# Patient Record
Sex: FEMALE | Race: WHITE | NOT HISPANIC OR LATINO | Employment: FULL TIME | URBAN - METROPOLITAN AREA
[De-identification: names, ages, dates, MRNs, and addresses within clinical notes are randomized per-mention and may not be internally consistent; named-entity substitution may affect disease eponyms.]

---

## 2019-07-11 ENCOUNTER — OFFICE VISIT (OUTPATIENT)
Dept: INTERNAL MEDICINE | Facility: CLINIC | Age: 54
End: 2019-07-11

## 2019-07-11 ENCOUNTER — CLINICAL SUPPORT (OUTPATIENT)
Dept: INTERNAL MEDICINE | Facility: CLINIC | Age: 54
End: 2019-07-11

## 2019-07-11 ENCOUNTER — TELEPHONE (OUTPATIENT)
Dept: INTERNAL MEDICINE | Facility: CLINIC | Age: 54
End: 2019-07-11

## 2019-07-11 VITALS
TEMPERATURE: 98 F | HEIGHT: 67 IN | HEART RATE: 80 BPM | WEIGHT: 190.5 LBS | SYSTOLIC BLOOD PRESSURE: 122 MMHG | OXYGEN SATURATION: 99 % | BODY MASS INDEX: 29.9 KG/M2 | DIASTOLIC BLOOD PRESSURE: 86 MMHG

## 2019-07-11 DIAGNOSIS — Z00.00 ANNUAL PHYSICAL EXAM: Primary | ICD-10-CM

## 2019-07-11 DIAGNOSIS — Z00.00 ANNUAL PHYSICAL EXAM: ICD-10-CM

## 2019-07-11 DIAGNOSIS — R03.0 ELEVATED BLOOD PRESSURE READING: ICD-10-CM

## 2019-07-11 DIAGNOSIS — Z12.11 SCREENING FOR COLON CANCER: ICD-10-CM

## 2019-07-11 DIAGNOSIS — Z11.59 NEED FOR HEPATITIS C SCREENING TEST: ICD-10-CM

## 2019-07-11 DIAGNOSIS — Z01.419 WELL WOMAN EXAM: ICD-10-CM

## 2019-07-11 LAB
ALBUMIN SERPL BCP-MCNC: 4 G/DL (ref 3.5–5.2)
ALP SERPL-CCNC: 78 U/L (ref 55–135)
ALT SERPL W/O P-5'-P-CCNC: 20 U/L (ref 10–44)
ANION GAP SERPL CALC-SCNC: 10 MMOL/L (ref 8–16)
AST SERPL-CCNC: 26 U/L (ref 10–40)
BILIRUB SERPL-MCNC: 0.3 MG/DL (ref 0.1–1)
BUN SERPL-MCNC: 13 MG/DL (ref 6–20)
CALCIUM SERPL-MCNC: 9.4 MG/DL (ref 8.7–10.5)
CHLORIDE SERPL-SCNC: 108 MMOL/L (ref 95–110)
CHOLEST SERPL-MCNC: 220 MG/DL (ref 120–199)
CHOLEST/HDLC SERPL: 3.1 {RATIO} (ref 2–5)
CO2 SERPL-SCNC: 23 MMOL/L (ref 23–29)
CREAT SERPL-MCNC: 0.9 MG/DL (ref 0.5–1.4)
ERYTHROCYTE [DISTWIDTH] IN BLOOD BY AUTOMATED COUNT: 14.2 % (ref 11.5–14.5)
EST. GFR  (AFRICAN AMERICAN): >60 ML/MIN/1.73 M^2
EST. GFR  (NON AFRICAN AMERICAN): >60 ML/MIN/1.73 M^2
ESTIMATED AVG GLUCOSE: 111 MG/DL (ref 68–131)
GLUCOSE SERPL-MCNC: 111 MG/DL (ref 70–110)
HBA1C MFR BLD HPLC: 5.5 % (ref 4–5.6)
HCT VFR BLD AUTO: 39.6 % (ref 37–48.5)
HDLC SERPL-MCNC: 71 MG/DL (ref 40–75)
HDLC SERPL: 32.3 % (ref 20–50)
HGB BLD-MCNC: 12.4 G/DL (ref 12–16)
LDLC SERPL CALC-MCNC: 138.6 MG/DL (ref 63–159)
MCH RBC QN AUTO: 30.2 PG (ref 27–31)
MCHC RBC AUTO-ENTMCNC: 31.3 G/DL (ref 32–36)
MCV RBC AUTO: 97 FL (ref 82–98)
NONHDLC SERPL-MCNC: 149 MG/DL
PLATELET # BLD AUTO: 260 K/UL (ref 150–350)
PMV BLD AUTO: 11.3 FL (ref 9.2–12.9)
POTASSIUM SERPL-SCNC: 4 MMOL/L (ref 3.5–5.1)
PROT SERPL-MCNC: 7.6 G/DL (ref 6–8.4)
RBC # BLD AUTO: 4.1 M/UL (ref 4–5.4)
SODIUM SERPL-SCNC: 141 MMOL/L (ref 136–145)
TRIGL SERPL-MCNC: 52 MG/DL (ref 30–150)
TSH SERPL DL<=0.005 MIU/L-ACNC: 1.35 UIU/ML (ref 0.4–4)
WBC # BLD AUTO: 6.71 K/UL (ref 3.9–12.7)

## 2019-07-11 PROCEDURE — 80061 LIPID PANEL: CPT

## 2019-07-11 PROCEDURE — 99386 PREV VISIT NEW AGE 40-64: CPT | Mod: S$PBB,,, | Performed by: FAMILY MEDICINE

## 2019-07-11 PROCEDURE — 80053 COMPREHEN METABOLIC PANEL: CPT

## 2019-07-11 PROCEDURE — 99999 PR PBB SHADOW E&M-NEW PATIENT-LVL III: CPT | Mod: PBBFAC,,, | Performed by: FAMILY MEDICINE

## 2019-07-11 PROCEDURE — 85027 COMPLETE CBC AUTOMATED: CPT

## 2019-07-11 PROCEDURE — 99386 PR PREVENTIVE VISIT,NEW,40-64: ICD-10-PCS | Mod: S$PBB,,, | Performed by: FAMILY MEDICINE

## 2019-07-11 PROCEDURE — 84443 ASSAY THYROID STIM HORMONE: CPT

## 2019-07-11 PROCEDURE — 99203 OFFICE O/P NEW LOW 30 MIN: CPT | Mod: PBBFAC | Performed by: FAMILY MEDICINE

## 2019-07-11 PROCEDURE — 83036 HEMOGLOBIN GLYCOSYLATED A1C: CPT

## 2019-07-11 PROCEDURE — 99999 PR PBB SHADOW E&M-NEW PATIENT-LVL III: ICD-10-PCS | Mod: PBBFAC,,, | Performed by: FAMILY MEDICINE

## 2019-07-11 NOTE — PROGRESS NOTES
Subjective:      Patient ID: Celi Bermeo is a 54 y.o. female.    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    No new complaints today.    Completed CBC, CMP, TSH, Lipid panel, hemoglobin A1c today; results pending.    Blood pressure elevated on initial check per medical assistant today.  Patient states she is nervous for her exam today.    Health Care Maintenance:  Last tetanus booster:  States she is up to date (records with Employee Health)  Shingles vaccination:  Deferred today  Last pap smear:  3 years ago; needs OBGYN referral  Last mammogram:  May 2019 through [facility]  Colon cancer screening:  Has not had in the past, prefers FIT kit  Hepatitis C screening:  Has not had in the past.    History reviewed. No pertinent past medical history.    Past Surgical History:   Procedure Laterality Date    APPENDECTOMY  1979     SECTION         Family History   Problem Relation Age of Onset    Ulcers Mother         Duodenal ulcer    Diabetes type II Father        Social History     Socioeconomic History    Marital status: Unknown     Spouse name: Not on file    Number of children: Not on file    Years of education: Not on file    Highest education level: Not on file   Occupational History    Occupation: Pediatric endocrinologist   Social Needs    Financial resource strain: Not on file    Food insecurity:     Worry: Not on file     Inability: Not on file    Transportation needs:     Medical: Not on file     Non-medical: Not on file   Tobacco Use    Smoking status: Never Smoker    Smokeless tobacco: Never Used   Substance and Sexual Activity    Alcohol use: Yes     Frequency: 2-4 times a month     Drinks per session: 1 or 2    Drug use: Never    Sexual activity: Yes     Partners: Male   Lifestyle    Physical activity:     Days per week: Not on file      "Minutes per session: Not on file    Stress: Not on file   Relationships    Social connections:     Talks on phone: Not on file     Gets together: Not on file     Attends [employer] service: Not on file     Active member of club or organization: Not on file     Attends meetings of clubs or organizations: Not on file     Relationship status: Not on file   Other Topics Concern    Not on file   Social History Narrative    Not on file       Review of Systems   Constitutional: Negative for chills, fatigue and fever.   HENT: Negative for congestion, hearing loss, nosebleeds, rhinorrhea, sore throat and trouble swallowing.    Eyes: Negative for pain and visual disturbance.   Respiratory: Negative for cough, shortness of breath and wheezing.    Cardiovascular: Negative for chest pain and palpitations.   Gastrointestinal: Negative for abdominal distention, abdominal pain, constipation, diarrhea, nausea and vomiting.   Genitourinary: Negative for decreased urine volume, difficulty urinating, dysuria, hematuria and urgency.   Musculoskeletal: Negative for arthralgias, back pain and myalgias.   Skin: Negative for color change and rash.   Neurological: Negative for dizziness, tremors, weakness, light-headedness, numbness and headaches.   Psychiatric/Behavioral: Negative for agitation, behavioral problems and confusion. The patient is not nervous/anxious.      Objective:     Vitals:    07/11/19 0936 07/11/19 0959   BP: (!) 140/90 122/86   BP Location: Left arm    Patient Position: Sitting    BP Method: Medium (Manual)    Pulse: 80    Temp: 98.4 °F (36.9 °C)    TempSrc: Oral    SpO2: 99%    Weight: 86.4 kg (190 lb 7.6 oz)    Height: 5' 7" (1.702 m)        Physical Exam   Constitutional: She is oriented to person, place, and time. She appears well-developed and well-nourished.   HENT:   Head: Normocephalic and atraumatic.   Right Ear: External ear normal.   Left Ear: External ear normal.   Nose: Nose normal.   Mouth/Throat: " Oropharynx is clear and moist.   Eyes: Pupils are equal, round, and reactive to light. Conjunctivae and EOM are normal.   Neck: Normal range of motion. Neck supple. No tracheal deviation present.   Cardiovascular: Normal rate, regular rhythm and normal heart sounds. Exam reveals no gallop and no friction rub.   No murmur heard.  Pulmonary/Chest: Effort normal and breath sounds normal. No respiratory distress. She has no wheezes. She has no rales.   Abdominal: Soft. Bowel sounds are normal. She exhibits no distension. There is no tenderness. There is no rebound and no guarding.   Musculoskeletal: Normal range of motion. She exhibits no edema or deformity.   Neurological: She is alert and oriented to person, place, and time. Coordination normal.   Skin: Skin is warm and dry.   Psychiatric: She has a normal mood and affect. Her behavior is normal.   Nursing note and vitals reviewed.     Assessment:      1. Annual physical exam    2. Need for hepatitis C screening test    3. Screening for colon cancer    4. Well woman exam      Plan:   Celi was seen today for employment physical.    Diagnoses and all orders for this visit:    Annual physical exam        -     Letter to be sent once labs resulted.  To complete release of records form to obtain copy of mammogram report from [facility].    Elevated blood pressure reading        -     Improved on recheck.  Likely related to anxiety.  Continue to monitor.    Need for hepatitis C screening test  -     Hepatitis C antibody; Future    Screening for colon cancer  -     Fecal Immunochemical Test (iFOBT); Future  -     FitKit was given to patient on 7/11/2019 10:00 AM     Well woman exam  -     Ambulatory Referral to Obstetrics / Gynecology

## 2019-07-25 ENCOUNTER — PATIENT OUTREACH (OUTPATIENT)
Dept: ADMINISTRATIVE | Facility: HOSPITAL | Age: 54
End: 2019-07-25

## 2019-07-25 DIAGNOSIS — Z11.59 NEED FOR HEPATITIS C SCREENING TEST: Primary | ICD-10-CM

## 2019-07-25 NOTE — PROGRESS NOTES
Outreach to pt completed. Pt was given FIT at her last office visit. Pt says she will complete and turn it in once her insurance coverage is effective. Pt also aware she is due for pap, will schedule once she has insurance.

## 2019-08-07 ENCOUNTER — PATIENT OUTREACH (OUTPATIENT)
Dept: ADMINISTRATIVE | Facility: HOSPITAL | Age: 54
End: 2019-08-07

## 2020-04-21 DIAGNOSIS — Z01.84 ANTIBODY RESPONSE EXAMINATION: ICD-10-CM

## 2020-05-21 DIAGNOSIS — Z01.84 ANTIBODY RESPONSE EXAMINATION: ICD-10-CM

## 2020-06-20 DIAGNOSIS — Z01.84 ANTIBODY RESPONSE EXAMINATION: ICD-10-CM

## 2020-06-30 ENCOUNTER — OFFICE VISIT (OUTPATIENT)
Dept: INTERNAL MEDICINE | Facility: CLINIC | Age: 55
End: 2020-06-30
Payer: COMMERCIAL

## 2020-06-30 ENCOUNTER — CLINICAL SUPPORT (OUTPATIENT)
Dept: INTERNAL MEDICINE | Facility: CLINIC | Age: 55
End: 2020-06-30
Payer: COMMERCIAL

## 2020-06-30 VITALS
DIASTOLIC BLOOD PRESSURE: 78 MMHG | WEIGHT: 201.13 LBS | HEART RATE: 91 BPM | TEMPERATURE: 98 F | BODY MASS INDEX: 31.5 KG/M2 | SYSTOLIC BLOOD PRESSURE: 110 MMHG

## 2020-06-30 DIAGNOSIS — Z12.11 COLON CANCER SCREENING: Primary | ICD-10-CM

## 2020-06-30 DIAGNOSIS — Z00.00 ROUTINE GENERAL MEDICAL EXAMINATION AT A HEALTH CARE FACILITY: Primary | ICD-10-CM

## 2020-06-30 DIAGNOSIS — Z12.4 CERVICAL CANCER SCREENING: ICD-10-CM

## 2020-06-30 LAB
ALBUMIN SERPL BCP-MCNC: 3.9 G/DL (ref 3.5–5.2)
ALP SERPL-CCNC: 84 U/L (ref 55–135)
ALT SERPL W/O P-5'-P-CCNC: 21 U/L (ref 10–44)
ANION GAP SERPL CALC-SCNC: 8 MMOL/L (ref 8–16)
AST SERPL-CCNC: 25 U/L (ref 10–40)
BILIRUB SERPL-MCNC: 0.5 MG/DL (ref 0.1–1)
BUN SERPL-MCNC: 14 MG/DL (ref 6–20)
CALCIUM SERPL-MCNC: 9.5 MG/DL (ref 8.7–10.5)
CHLORIDE SERPL-SCNC: 107 MMOL/L (ref 95–110)
CHOLEST SERPL-MCNC: 202 MG/DL (ref 120–199)
CHOLEST/HDLC SERPL: 2.8 {RATIO} (ref 2–5)
CO2 SERPL-SCNC: 26 MMOL/L (ref 23–29)
CREAT SERPL-MCNC: 0.8 MG/DL (ref 0.5–1.4)
ERYTHROCYTE [DISTWIDTH] IN BLOOD BY AUTOMATED COUNT: 13.1 % (ref 11.5–14.5)
EST. GFR  (AFRICAN AMERICAN): >60 ML/MIN/1.73 M^2
EST. GFR  (NON AFRICAN AMERICAN): >60 ML/MIN/1.73 M^2
ESTIMATED AVG GLUCOSE: 111 MG/DL (ref 68–131)
GLUCOSE SERPL-MCNC: 107 MG/DL (ref 70–110)
HBA1C MFR BLD HPLC: 5.5 % (ref 4–5.6)
HCT VFR BLD AUTO: 41.1 % (ref 37–48.5)
HCV AB SERPL QL IA: NEGATIVE
HDLC SERPL-MCNC: 71 MG/DL (ref 40–75)
HDLC SERPL: 35.1 % (ref 20–50)
HGB BLD-MCNC: 13.1 G/DL (ref 12–16)
LDLC SERPL CALC-MCNC: 114.4 MG/DL (ref 63–159)
MCH RBC QN AUTO: 30.1 PG (ref 27–31)
MCHC RBC AUTO-ENTMCNC: 31.9 G/DL (ref 32–36)
MCV RBC AUTO: 95 FL (ref 82–98)
NONHDLC SERPL-MCNC: 131 MG/DL
PLATELET # BLD AUTO: 240 K/UL (ref 150–350)
PMV BLD AUTO: 11.6 FL (ref 9.2–12.9)
POTASSIUM SERPL-SCNC: 3.6 MMOL/L (ref 3.5–5.1)
PROT SERPL-MCNC: 7.6 G/DL (ref 6–8.4)
RBC # BLD AUTO: 4.35 M/UL (ref 4–5.4)
SODIUM SERPL-SCNC: 141 MMOL/L (ref 136–145)
TRIGL SERPL-MCNC: 83 MG/DL (ref 30–150)
TSH SERPL DL<=0.005 MIU/L-ACNC: 1.04 UIU/ML (ref 0.4–4)
WBC # BLD AUTO: 7.44 K/UL (ref 3.9–12.7)

## 2020-06-30 PROCEDURE — 99396 PREV VISIT EST AGE 40-64: CPT | Mod: S$GLB,,, | Performed by: INTERNAL MEDICINE

## 2020-06-30 PROCEDURE — 80061 LIPID PANEL: CPT

## 2020-06-30 PROCEDURE — 80053 COMPREHEN METABOLIC PANEL: CPT

## 2020-06-30 PROCEDURE — 84443 ASSAY THYROID STIM HORMONE: CPT

## 2020-06-30 PROCEDURE — 99396 PR PREVENTIVE VISIT,EST,40-64: ICD-10-PCS | Mod: S$GLB,,, | Performed by: INTERNAL MEDICINE

## 2020-06-30 PROCEDURE — 86803 HEPATITIS C AB TEST: CPT

## 2020-06-30 PROCEDURE — 36415 COLL VENOUS BLD VENIPUNCTURE: CPT

## 2020-06-30 PROCEDURE — 83036 HEMOGLOBIN GLYCOSYLATED A1C: CPT

## 2020-06-30 PROCEDURE — 85027 COMPLETE CBC AUTOMATED: CPT

## 2020-06-30 PROCEDURE — 99999 PR PBB SHADOW E&M-EST. PATIENT-LVL III: ICD-10-PCS | Mod: PBBFAC,,, | Performed by: INTERNAL MEDICINE

## 2020-06-30 PROCEDURE — 99999 PR PBB SHADOW E&M-EST. PATIENT-LVL III: CPT | Mod: PBBFAC,,, | Performed by: INTERNAL MEDICINE

## 2020-06-30 NOTE — LETTER
June 30, 2020    Celi Bermeo  [address] [place] [address]       [facility]  [address] [place] [address]  Phone: [phone]  Fax: [phone] Dear Dr. Bermeo:    Thank you for allowing me to serve you and perform your Executive Health exam on 6/30/2020.  This letter will serve a brief summary of the history, physical findings, and labs/studies performed and discussions at that time. I have included the lab and study results for you to have available at future healthcare appointments.    Reason for Visit: Executive Health Preventive Physical Examination     Physical Exam: No concerning findings.    Labs:  Comprehensive metabolic panel    Collection Time: 06/30/20 12:12 PM   Result Value Ref Range    Sodium 141 136 - 145 mmol/L    Potassium 3.6 3.5 - 5.1 mmol/L    Chloride 107 95 - 110 mmol/L    CO2 26 23 - 29 mmol/L    Glucose 107 70 - 110 mg/dL    BUN, Bld 14 6 - 20 mg/dL    Creatinine 0.8 0.5 - 1.4 mg/dL    Calcium 9.5 8.7 - 10.5 mg/dL    Total Protein 7.6 6.0 - 8.4 g/dL    Albumin 3.9 3.5 - 5.2 g/dL    Total Bilirubin 0.5 0.1 - 1.0 mg/dL    Alkaline Phosphatase 84 55 - 135 U/L    AST 25 10 - 40 U/L    ALT 21 10 - 44 U/L    Anion Gap 8 8 - 16 mmol/L    eGFR if African American >60.0 >60 mL/min/1.73 m^2    eGFR if non African American >60.0 >60 mL/min/1.73 m^2   CBC Without Differential    Collection Time: 06/30/20 12:12 PM   Result Value Ref Range    WBC 7.44 3.90 - 12.70 K/uL    RBC 4.35 4.00 - 5.40 M/uL    Hemoglobin 13.1 12.0 - 16.0 g/dL    Hematocrit 41.1 37.0 - 48.5 %    Mean Corpuscular Volume 95 82 - 98 fL    Mean Corpuscular Hemoglobin 30.1 27.0 - 31.0 pg    Mean Corpuscular Hemoglobin Conc 31.9 (L) 32.0 - 36.0 g/dL    RDW 13.1 11.5 - 14.5 %    Platelets 240 150 - 350 K/uL    MPV 11.6 9.2 - 12.9 fL   Lipid panel    Collection Time: 06/30/20 12:12 PM   Result Value Ref Range    Cholesterol 202 (H) 120 - 199 mg/dL     Triglycerides 83 30 - 150 mg/dL    HDL 71 40 - 75 mg/dL    LDL Cholesterol 114.4 63.0 - 159.0 mg/dL    Hdl/Cholesterol Ratio 35.1 20.0 - 50.0 %    Total Cholesterol/HDL Ratio 2.8 2.0 - 5.0    Non-HDL Cholesterol 131 mg/dL   TSH    Collection Time: 06/30/20 12:12 PM   Result Value Ref Range    TSH 1.042 0.400 - 4.000 uIU/mL   Hemoglobin A1c    Collection Time: 06/30/20 12:12 PM   Result Value Ref Range    Hemoglobin A1C 5.5 4.0 - 5.6 %    Estimated Avg Glucose 111 68 - 131 mg/dL   Hepatitis C Antibody    Collection Time: 06/30/20 12:12 PM   Result Value Ref Range    Hepatitis C Ab Negative Negative         Assessment/Recommendations:    Health Maintenance and Prevention:  - Annual flu vaccine.  - Tdap up to date (2019).  - You requested holding off on Shingrix. Let me know if you decide to get it and we can certainly have that done in our clinic.    - Hepatitis C screen was negative.    - PAP was normal 4 years ago. GYN referral placed for repeat.  - Mammogram normal 2019. As discussed, 2 year follow up in 2021 is very reasonable.  - FIT testing ordered. If normal, plan for repeat yearly. Let me know if there is any delay in receiving your kit.    - Blood pressure, glucose, and lipids in normal range.      It was a pleasure meeting you for your annual health exam, Dr. Bermeo. I look forward to seeing you again next year.     If you have any questions or concerns, please don't hesitate to call.    Sincerely,    Ander Moreno MD

## 2020-07-01 NOTE — PROGRESS NOTES
Subjective:       Patient ID: Celi Bermeo is a 55 y.o. female.    Chief Complaint: Annual Exam    HPI:  Here for annual health exam. No new complaints. 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 No previous major medical diagnoses.    HM:  Tdap received last year with .  Would like to hold off on Shingrix at this time.  Last mammo normal last year. Prefers q2y testing.  PAP normal 4 years ago. Not established with Gyn in [place] yet.  No colon cancer screening yet. Prefers FIT yearly.    Review of Systems   Constitutional: Negative for activity change, fatigue and fever.   HENT: Negative for hearing loss and sinus pain.    Eyes: Negative for visual disturbance.   Respiratory: Negative for cough and shortness of breath.    Cardiovascular: Negative for chest pain and palpitations.   Gastrointestinal: Negative for abdominal pain, constipation, diarrhea, nausea and vomiting.   Genitourinary: Negative for dysuria and frequency.   Musculoskeletal: Negative for arthralgias and joint swelling.   Skin: Negative for rash and wound.   Neurological: Negative for dizziness, weakness and headaches.   Psychiatric/Behavioral: Negative for dysphoric mood. The patient is not nervous/anxious.        History reviewed. No pertinent past medical history.     No current outpatient medications on file.     Past Surgical History:   Procedure Laterality Date    APPENDECTOMY  1979     SECTION       Family History   Problem Relation Age of Onset    Ulcers Mother         Duodenal ulcer    Diabetes type II Father      Social History     Tobacco Use    Smoking status: Never Smoker    Smokeless tobacco: Never Used   Substance Use Topics    Alcohol use: Yes     Frequency: 2-4 times a month     Drinks per session: 1 or 2    Drug use: Never     Objective:       Vitals:    20 1310   BP: 110/78   Pulse: 91   Temp: 98.3 °F (36.8 °C)     Physical Exam  Constitutional:       General: She is not  in acute distress.     Appearance: Normal appearance.   HENT:      Head: Normocephalic and atraumatic.   Eyes:      Extraocular Movements: Extraocular movements intact.      Conjunctiva/sclera: Conjunctivae normal.   Cardiovascular:      Rate and Rhythm: Normal rate and regular rhythm.      Heart sounds: Normal heart sounds. No murmur.   Pulmonary:      Effort: Pulmonary effort is normal. No respiratory distress.      Breath sounds: Normal breath sounds.   Abdominal:      General: Abdomen is flat. There is no distension.   Musculoskeletal:         General: No swelling or deformity.   Skin:     General: Skin is warm and dry.      Findings: No lesion or rash.   Neurological:      General: No focal deficit present.      Mental Status: She is alert and oriented to person, place, and time.      Coordination: Coordination normal.      Gait: Gait normal.      Deep Tendon Reflexes: Reflexes normal.   Psychiatric:         Mood and Affect: Mood normal.         Behavior: Behavior normal.         Thought Content: Thought content normal.         Judgment: Judgment normal.       Recent Results (from the past 2016 hour(s))   Comprehensive metabolic panel    Collection Time: 06/30/20 12:12 PM   Result Value Ref Range    Sodium 141 136 - 145 mmol/L    Potassium 3.6 3.5 - 5.1 mmol/L    Chloride 107 95 - 110 mmol/L    CO2 26 23 - 29 mmol/L    Glucose 107 70 - 110 mg/dL    BUN, Bld 14 6 - 20 mg/dL    Creatinine 0.8 0.5 - 1.4 mg/dL    Calcium 9.5 8.7 - 10.5 mg/dL    Total Protein 7.6 6.0 - 8.4 g/dL    Albumin 3.9 3.5 - 5.2 g/dL    Total Bilirubin 0.5 0.1 - 1.0 mg/dL    Alkaline Phosphatase 84 55 - 135 U/L    AST 25 10 - 40 U/L    ALT 21 10 - 44 U/L    Anion Gap 8 8 - 16 mmol/L    eGFR if African American >60.0 >60 mL/min/1.73 m^2    eGFR if non African American >60.0 >60 mL/min/1.73 m^2   CBC Without Differential    Collection Time: 06/30/20 12:12 PM   Result Value Ref Range    WBC 7.44 3.90 - 12.70 K/uL    RBC 4.35 4.00 - 5.40 M/uL     Hemoglobin 13.1 12.0 - 16.0 g/dL    Hematocrit 41.1 37.0 - 48.5 %    Mean Corpuscular Volume 95 82 - 98 fL    Mean Corpuscular Hemoglobin 30.1 27.0 - 31.0 pg    Mean Corpuscular Hemoglobin Conc 31.9 (L) 32.0 - 36.0 g/dL    RDW 13.1 11.5 - 14.5 %    Platelets 240 150 - 350 K/uL    MPV 11.6 9.2 - 12.9 fL   Lipid panel    Collection Time: 06/30/20 12:12 PM   Result Value Ref Range    Cholesterol 202 (H) 120 - 199 mg/dL    Triglycerides 83 30 - 150 mg/dL    HDL 71 40 - 75 mg/dL    LDL Cholesterol 114.4 63.0 - 159.0 mg/dL    Hdl/Cholesterol Ratio 35.1 20.0 - 50.0 %    Total Cholesterol/HDL Ratio 2.8 2.0 - 5.0    Non-HDL Cholesterol 131 mg/dL   TSH    Collection Time: 06/30/20 12:12 PM   Result Value Ref Range    TSH 1.042 0.400 - 4.000 uIU/mL   Hemoglobin A1c    Collection Time: 06/30/20 12:12 PM   Result Value Ref Range    Hemoglobin A1C 5.5 4.0 - 5.6 %    Estimated Avg Glucose 111 68 - 131 mg/dL   Hepatitis C Antibody    Collection Time: 06/30/20 12:12 PM   Result Value Ref Range    Hepatitis C Ab Negative Negative          Assessment/Plan:     1) Annual Exam / HM review:  - Annual flu vaccine  - Tdap up to date (2019)  - Declines Shingrix at this time.  - Hep C screen negative.  - PAP normal 4 years ago. GYN referral placed for repeat.  - Mammo normal 2019. Prefers q2y screening.  - FIT testing ordered. If normal, plan for repeat yearly.  - BP, BG, lipids all wnl.    RTC 1 year for annual health exam or sooner if needed. Will f/u results of PAP and FIT as available.

## 2020-07-20 DIAGNOSIS — Z01.84 ANTIBODY RESPONSE EXAMINATION: ICD-10-CM

## 2020-08-19 ENCOUNTER — TELEPHONE (OUTPATIENT)
Dept: INTERNAL MEDICINE | Facility: CLINIC | Age: 55
End: 2020-08-19

## 2020-08-19 DIAGNOSIS — Z01.84 ANTIBODY RESPONSE EXAMINATION: ICD-10-CM

## 2020-08-19 DIAGNOSIS — B00.2 ORAL HERPES SIMPLEX INFECTION: Primary | ICD-10-CM

## 2020-08-19 RX ORDER — VALACYCLOVIR HYDROCHLORIDE 1 G/1
2000 TABLET, FILM COATED ORAL EVERY 12 HOURS
Qty: 4 TABLET | Refills: 1 | Status: SHIPPED | OUTPATIENT
Start: 2020-08-19 | End: 2020-08-20 | Stop reason: SDUPTHER

## 2020-08-19 NOTE — TELEPHONE ENCOUNTER
Spoke with Dr. Bermeo. Beginnings of cold sore forming after heavy sun exposure. Has responded well to Valacyclovir in past. Will send prescription.

## 2020-08-20 DIAGNOSIS — B00.2 ORAL HERPES SIMPLEX INFECTION: ICD-10-CM

## 2020-08-20 RX ORDER — VALACYCLOVIR HYDROCHLORIDE 1 G/1
1000 TABLET, FILM COATED ORAL EVERY 8 HOURS PRN
Qty: 21 TABLET | Refills: 2 | Status: SHIPPED | OUTPATIENT
Start: 2020-08-20 | End: 2021-05-17

## 2020-09-18 DIAGNOSIS — Z01.84 ANTIBODY RESPONSE EXAMINATION: ICD-10-CM

## 2020-10-12 ENCOUNTER — TELEPHONE (OUTPATIENT)
Dept: INTERNAL MEDICINE | Facility: CLINIC | Age: 55
End: 2020-10-12

## 2020-10-12 DIAGNOSIS — E66.9 OBESITY, UNSPECIFIED CLASSIFICATION, UNSPECIFIED OBESITY TYPE, UNSPECIFIED WHETHER SERIOUS COMORBIDITY PRESENT: Primary | ICD-10-CM

## 2020-10-12 DIAGNOSIS — E88.819 INSULIN RESISTANCE: ICD-10-CM

## 2020-10-12 RX ORDER — ORAL SEMAGLUTIDE 3 MG/1
3 TABLET ORAL DAILY
Qty: 30 TABLET | Refills: 0 | Status: SHIPPED | OUTPATIENT
Start: 2020-10-12 | End: 2020-10-14 | Stop reason: SDUPTHER

## 2020-10-12 NOTE — TELEPHONE ENCOUNTER
Contacted by Dr. Pantoja today. Interested in starting oral Rybelsus for weight loss and insulin resistance. Very reasonable option. Sending 30 days of 3 mg tablets daily. Will plan to increase to 7 mg daily if tolerated well.

## 2020-10-14 RX ORDER — ORAL SEMAGLUTIDE 3 MG/1
3 TABLET ORAL DAILY
Qty: 30 TABLET | Refills: 0 | Status: SHIPPED | OUTPATIENT
Start: 2020-10-14 | End: 2020-11-06

## 2020-10-18 DIAGNOSIS — Z01.84 ANTIBODY RESPONSE EXAMINATION: ICD-10-CM

## 2020-11-06 DIAGNOSIS — E88.819 INSULIN RESISTANCE: ICD-10-CM

## 2020-11-06 DIAGNOSIS — E66.9 OBESITY, UNSPECIFIED CLASSIFICATION, UNSPECIFIED OBESITY TYPE, UNSPECIFIED WHETHER SERIOUS COMORBIDITY PRESENT: ICD-10-CM

## 2020-11-06 RX ORDER — ORAL SEMAGLUTIDE 3 MG/1
3 TABLET ORAL DAILY
Qty: 30 TABLET | Refills: 5 | Status: SHIPPED | OUTPATIENT
Start: 2020-11-06 | End: 2020-11-07

## 2020-11-07 RX ORDER — ORAL SEMAGLUTIDE 7 MG/1
7 TABLET ORAL DAILY
Qty: 30 TABLET | Refills: 0 | Status: SHIPPED | OUTPATIENT
Start: 2020-11-07 | End: 2020-12-02

## 2020-11-10 ENCOUNTER — PATIENT OUTREACH (OUTPATIENT)
Dept: ADMINISTRATIVE | Facility: OTHER | Age: 55
End: 2020-11-10

## 2020-11-12 ENCOUNTER — OFFICE VISIT (OUTPATIENT)
Dept: CARDIOLOGY | Facility: CLINIC | Age: 55
End: 2020-11-12
Payer: COMMERCIAL

## 2020-11-12 VITALS
DIASTOLIC BLOOD PRESSURE: 74 MMHG | HEIGHT: 67 IN | HEART RATE: 85 BPM | BODY MASS INDEX: 32.53 KG/M2 | SYSTOLIC BLOOD PRESSURE: 152 MMHG | WEIGHT: 207.25 LBS

## 2020-11-12 DIAGNOSIS — E78.00 PURE HYPERCHOLESTEROLEMIA: ICD-10-CM

## 2020-11-12 DIAGNOSIS — R00.2 PALPITATIONS: Primary | ICD-10-CM

## 2020-11-12 DIAGNOSIS — R03.0 ELEVATED BLOOD PRESSURE READING: ICD-10-CM

## 2020-11-12 PROCEDURE — 93000 EKG 12-LEAD: ICD-10-PCS | Mod: S$GLB,,, | Performed by: INTERNAL MEDICINE

## 2020-11-12 PROCEDURE — 99999 PR PBB SHADOW E&M-EST. PATIENT-LVL III: ICD-10-PCS | Mod: PBBFAC,,, | Performed by: INTERNAL MEDICINE

## 2020-11-12 PROCEDURE — 99999 PR PBB SHADOW E&M-EST. PATIENT-LVL III: CPT | Mod: PBBFAC,,, | Performed by: INTERNAL MEDICINE

## 2020-11-12 PROCEDURE — 93000 ELECTROCARDIOGRAM COMPLETE: CPT | Mod: S$GLB,,, | Performed by: INTERNAL MEDICINE

## 2020-11-12 PROCEDURE — 99205 OFFICE O/P NEW HI 60 MIN: CPT | Mod: S$GLB,,, | Performed by: INTERNAL MEDICINE

## 2020-11-12 PROCEDURE — 99205 PR OFFICE/OUTPT VISIT, NEW, LEVL V, 60-74 MIN: ICD-10-PCS | Mod: S$GLB,,, | Performed by: INTERNAL MEDICINE

## 2020-11-12 RX ORDER — CALCIUM CARBONATE 500(1250)
1 TABLET ORAL DAILY
COMMUNITY
End: 2023-10-10

## 2020-11-12 NOTE — PROGRESS NOTES
Subjective:       Patient ID: Celi Bermeo is a 55 y.o. female.    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 She has had palpitations for approximately 10 years, which started when she was living in [place] and taking an amphetamine medication for weight loss. She stopped taking this medication, but continues to have episodes of palpitations 3-4 times per year. She describes a rapid heart rate which may last 1-2 hours, typically relieved with valsalva and/or carotid massage. She denies any history of TIA, stroke, chest pain/pressure/tightness/discomfort, MAHAN, orthopnea, PND, edema or syncope. She has never smoked, rarely consumes alcohol and there is no family history of CAD.    Review of Systems   All other systems reviewed and are negative.      Objective:       Vitals:    11/12/20 1129   BP: (!) 152/74   Pulse: 85       Physical Exam  Constitutional:       Appearance: She is well-developed. She is not diaphoretic.   HENT:      Head: Normocephalic and atraumatic.   Eyes:      Pupils: Pupils are equal, round, and reactive to light.   Neck:      Musculoskeletal: Normal range of motion.      Vascular: No carotid bruit or JVD.   Cardiovascular:      Rate and Rhythm: Normal rate and regular rhythm.      Pulses:           Radial pulses are 2+ on the right side and 2+ on the left side.        Dorsalis pedis pulses are 2+ on the right side and 2+ on the left side.        Posterior tibial pulses are 2+ on the right side and 2+ on the left side.      Heart sounds: Normal heart sounds. Heart sounds not distant. No murmur. No friction rub. No gallop. No S3 or S4 sounds.    Pulmonary:      Effort: Pulmonary effort is normal. No respiratory distress.      Breath sounds: Normal breath sounds. No wheezing.   Abdominal:      General: Bowel sounds are normal. There is no distension.       Palpations: Abdomen is soft.      Tenderness: There is no abdominal tenderness.   Skin:     General: Skin is warm.      Findings: No erythema.   Neurological:      Mental Status: She is alert and oriented to person, place, and time.   Psychiatric:         Behavior: Behavior normal.         Assessment:       1. Palpitations    2. Pure hypercholesterolemia    3. Elevated blood pressure reading        Plan:       Palpitations  EKG reviewed in clinic; NSR, within normal limits  Due to the infrequency of palpitations (3-4 times/year) a 30 day event monitor is unlikely to reveal a diagnosis  We discussed options such as an implantable loop recorder or an apple watch with the EKG miesha; Dr. Bermeo is interested in the latter option  Check TTE    Elevated blood pressure reading  152/74 in clinic, patient states her BP has never been this high in the past  Maintain BP log, return to clinic with BP log and TTE results in 2 weeks  If a diagnosis of HTN is established, will discuss polysomnography  Discussed low sodium diet and the importance of exercise    Hyperlipidemia  10 year ASCVD risk 1.7%  Diet and lifestyle modifications as above    The ASCVD Risk score (Lizette DC Jr., et al., 2013) failed to calculate for the following reasons:    Unable to determine if patient is Non-       Celi was seen today for palpitations.    Diagnoses and all orders for this visit:    Palpitations  -     EKG 12-lead; Future  -     Echo Color Flow Doppler? Yes; Future    Pure hypercholesterolemia    Elevated blood pressure reading      Sam Kaplan MD  PGY-VI

## 2020-11-12 NOTE — PROGRESS NOTES
I have personally taken the history and examined this patient and agree with the fellow's note as stated above.Discussed the acquisition of a readily available home monitoring devices including the Apple watch or some of the less expensive obliquely available monitors.  She appears to be experiencing some form of atrial tachycardia since it has been alleviated by vagal maneuvers.  Once documented and if episodes become more frequent, will refer to EP for consideration of radiofrequency ablation

## 2020-11-17 DIAGNOSIS — Z01.84 ANTIBODY RESPONSE EXAMINATION: ICD-10-CM

## 2020-12-02 DIAGNOSIS — E66.9 OBESITY, UNSPECIFIED CLASSIFICATION, UNSPECIFIED OBESITY TYPE, UNSPECIFIED WHETHER SERIOUS COMORBIDITY PRESENT: ICD-10-CM

## 2020-12-02 DIAGNOSIS — E88.819 INSULIN RESISTANCE: Primary | ICD-10-CM

## 2020-12-02 RX ORDER — ORAL SEMAGLUTIDE 14 MG/1
14 TABLET ORAL DAILY
Qty: 30 TABLET | Refills: 11 | Status: SHIPPED | OUTPATIENT
Start: 2020-12-02 | End: 2020-12-19 | Stop reason: SDUPTHER

## 2020-12-19 DIAGNOSIS — E88.819 INSULIN RESISTANCE: ICD-10-CM

## 2020-12-19 DIAGNOSIS — E66.9 OBESITY, UNSPECIFIED CLASSIFICATION, UNSPECIFIED OBESITY TYPE, UNSPECIFIED WHETHER SERIOUS COMORBIDITY PRESENT: ICD-10-CM

## 2020-12-19 RX ORDER — ORAL SEMAGLUTIDE 14 MG/1
14 TABLET ORAL DAILY
Qty: 30 TABLET | Refills: 11 | Status: SHIPPED | OUTPATIENT
Start: 2020-12-19 | End: 2021-03-01 | Stop reason: SDUPTHER

## 2020-12-26 ENCOUNTER — IMMUNIZATION (OUTPATIENT)
Dept: INTERNAL MEDICINE | Facility: CLINIC | Age: 55
End: 2020-12-26
Payer: COMMERCIAL

## 2020-12-26 DIAGNOSIS — Z23 NEED FOR VACCINATION: ICD-10-CM

## 2020-12-26 PROCEDURE — 0001A COVID-19, MRNA, LNP-S, PF, 30 MCG/0.3 ML DOSE VACCINE: CPT | Mod: CV19,,, | Performed by: INTERNAL MEDICINE

## 2020-12-26 PROCEDURE — 0001A COVID-19, MRNA, LNP-S, PF, 30 MCG/0.3 ML DOSE VACCINE: ICD-10-PCS | Mod: CV19,,, | Performed by: INTERNAL MEDICINE

## 2020-12-26 PROCEDURE — 91300 COVID-19, MRNA, LNP-S, PF, 30 MCG/0.3 ML DOSE VACCINE: CPT | Mod: ,,, | Performed by: INTERNAL MEDICINE

## 2020-12-26 PROCEDURE — 91300 COVID-19, MRNA, LNP-S, PF, 30 MCG/0.3 ML DOSE VACCINE: ICD-10-PCS | Mod: ,,, | Performed by: INTERNAL MEDICINE

## 2021-01-05 ENCOUNTER — PATIENT OUTREACH (OUTPATIENT)
Dept: ADMINISTRATIVE | Facility: OTHER | Age: 56
End: 2021-01-05

## 2021-01-05 DIAGNOSIS — Z12.31 BREAST CANCER SCREENING BY MAMMOGRAM: Primary | ICD-10-CM

## 2021-01-07 ENCOUNTER — OFFICE VISIT (OUTPATIENT)
Dept: BARIATRICS | Facility: CLINIC | Age: 56
End: 2021-01-07
Payer: COMMERCIAL

## 2021-01-07 VITALS
DIASTOLIC BLOOD PRESSURE: 74 MMHG | OXYGEN SATURATION: 98 % | HEIGHT: 68 IN | WEIGHT: 201.69 LBS | HEART RATE: 83 BPM | BODY MASS INDEX: 30.57 KG/M2 | SYSTOLIC BLOOD PRESSURE: 136 MMHG

## 2021-01-07 DIAGNOSIS — E88.819 INSULIN RESISTANCE: ICD-10-CM

## 2021-01-07 DIAGNOSIS — E66.9 OBESITY, CLASS I, BMI 30.0-34.9 (SEE ACTUAL BMI): Primary | ICD-10-CM

## 2021-01-07 PROCEDURE — 99999 PR PBB SHADOW E&M-EST. PATIENT-LVL IV: ICD-10-PCS | Mod: PBBFAC,,, | Performed by: INTERNAL MEDICINE

## 2021-01-07 PROCEDURE — 99999 PR PBB SHADOW E&M-EST. PATIENT-LVL IV: CPT | Mod: PBBFAC,,, | Performed by: INTERNAL MEDICINE

## 2021-01-07 PROCEDURE — 3008F PR BODY MASS INDEX (BMI) DOCUMENTED: ICD-10-PCS | Mod: CPTII,S$GLB,, | Performed by: INTERNAL MEDICINE

## 2021-01-07 PROCEDURE — 3008F BODY MASS INDEX DOCD: CPT | Mod: CPTII,S$GLB,, | Performed by: INTERNAL MEDICINE

## 2021-01-07 PROCEDURE — 1126F AMNT PAIN NOTED NONE PRSNT: CPT | Mod: S$GLB,,, | Performed by: INTERNAL MEDICINE

## 2021-01-07 PROCEDURE — 99204 OFFICE O/P NEW MOD 45 MIN: CPT | Mod: S$GLB,,, | Performed by: INTERNAL MEDICINE

## 2021-01-07 PROCEDURE — 99204 PR OFFICE/OUTPT VISIT, NEW, LEVL IV, 45-59 MIN: ICD-10-PCS | Mod: S$GLB,,, | Performed by: INTERNAL MEDICINE

## 2021-01-07 PROCEDURE — 1126F PR PAIN SEVERITY QUANTIFIED, NO PAIN PRESENT: ICD-10-PCS | Mod: S$GLB,,, | Performed by: INTERNAL MEDICINE

## 2021-01-07 RX ORDER — TOPIRAMATE 25 MG/1
25 TABLET ORAL 2 TIMES DAILY
Qty: 60 TABLET | Refills: 3 | Status: SHIPPED | OUTPATIENT
Start: 2021-01-07 | End: 2021-03-30

## 2021-01-16 ENCOUNTER — IMMUNIZATION (OUTPATIENT)
Dept: INTERNAL MEDICINE | Facility: CLINIC | Age: 56
End: 2021-01-16
Payer: COMMERCIAL

## 2021-01-16 DIAGNOSIS — Z23 NEED FOR VACCINATION: Primary | ICD-10-CM

## 2021-01-16 PROCEDURE — 91300 COVID-19, MRNA, LNP-S, PF, 30 MCG/0.3 ML DOSE VACCINE: CPT | Mod: PBBFAC | Performed by: INTERNAL MEDICINE

## 2021-01-16 PROCEDURE — 0002A COVID-19, MRNA, LNP-S, PF, 30 MCG/0.3 ML DOSE VACCINE: CPT | Mod: PBBFAC | Performed by: INTERNAL MEDICINE

## 2021-01-29 ENCOUNTER — PATIENT MESSAGE (OUTPATIENT)
Dept: ADMINISTRATIVE | Facility: HOSPITAL | Age: 56
End: 2021-01-29

## 2021-02-05 ENCOUNTER — PATIENT OUTREACH (OUTPATIENT)
Dept: ADMINISTRATIVE | Facility: OTHER | Age: 56
End: 2021-02-05

## 2021-02-08 ENCOUNTER — OFFICE VISIT (OUTPATIENT)
Dept: BARIATRICS | Facility: CLINIC | Age: 56
End: 2021-02-08
Payer: COMMERCIAL

## 2021-02-08 VITALS
WEIGHT: 195.31 LBS | SYSTOLIC BLOOD PRESSURE: 116 MMHG | DIASTOLIC BLOOD PRESSURE: 84 MMHG | HEIGHT: 68 IN | HEART RATE: 101 BPM | BODY MASS INDEX: 29.6 KG/M2

## 2021-02-08 DIAGNOSIS — E66.9 OBESITY, CLASS I, BMI 30.0-34.9 (SEE ACTUAL BMI): Primary | ICD-10-CM

## 2021-02-08 PROCEDURE — 1126F PR PAIN SEVERITY QUANTIFIED, NO PAIN PRESENT: ICD-10-PCS | Mod: S$GLB,,, | Performed by: INTERNAL MEDICINE

## 2021-02-08 PROCEDURE — 99214 PR OFFICE/OUTPT VISIT, EST, LEVL IV, 30-39 MIN: ICD-10-PCS | Mod: S$GLB,,, | Performed by: INTERNAL MEDICINE

## 2021-02-08 PROCEDURE — 99999 PR PBB SHADOW E&M-EST. PATIENT-LVL III: ICD-10-PCS | Mod: PBBFAC,,, | Performed by: INTERNAL MEDICINE

## 2021-02-08 PROCEDURE — 99999 PR PBB SHADOW E&M-EST. PATIENT-LVL III: CPT | Mod: PBBFAC,,, | Performed by: INTERNAL MEDICINE

## 2021-02-08 PROCEDURE — 1126F AMNT PAIN NOTED NONE PRSNT: CPT | Mod: S$GLB,,, | Performed by: INTERNAL MEDICINE

## 2021-02-08 PROCEDURE — 3008F PR BODY MASS INDEX (BMI) DOCUMENTED: ICD-10-PCS | Mod: CPTII,S$GLB,, | Performed by: INTERNAL MEDICINE

## 2021-02-08 PROCEDURE — 99214 OFFICE O/P EST MOD 30 MIN: CPT | Mod: S$GLB,,, | Performed by: INTERNAL MEDICINE

## 2021-02-08 PROCEDURE — 3008F BODY MASS INDEX DOCD: CPT | Mod: CPTII,S$GLB,, | Performed by: INTERNAL MEDICINE

## 2021-03-01 DIAGNOSIS — E88.819 INSULIN RESISTANCE: ICD-10-CM

## 2021-03-01 DIAGNOSIS — R73.01 IFG (IMPAIRED FASTING GLUCOSE): Primary | ICD-10-CM

## 2021-03-01 DIAGNOSIS — E66.9 OBESITY, UNSPECIFIED CLASSIFICATION, UNSPECIFIED OBESITY TYPE, UNSPECIFIED WHETHER SERIOUS COMORBIDITY PRESENT: ICD-10-CM

## 2021-03-01 RX ORDER — ORAL SEMAGLUTIDE 14 MG/1
14 TABLET ORAL DAILY
Qty: 30 TABLET | Refills: 5 | Status: SHIPPED | OUTPATIENT
Start: 2021-03-01 | End: 2021-10-08 | Stop reason: SDUPTHER

## 2021-05-06 ENCOUNTER — PATIENT OUTREACH (OUTPATIENT)
Dept: ADMINISTRATIVE | Facility: OTHER | Age: 56
End: 2021-05-06

## 2021-05-17 DIAGNOSIS — B00.2 ORAL HERPES SIMPLEX INFECTION: ICD-10-CM

## 2021-05-17 RX ORDER — VALACYCLOVIR HYDROCHLORIDE 1 G/1
1000 TABLET, FILM COATED ORAL EVERY 8 HOURS PRN
Qty: 21 TABLET | Refills: 2 | Status: SHIPPED | OUTPATIENT
Start: 2021-05-17 | End: 2021-09-27

## 2021-07-06 ENCOUNTER — PATIENT OUTREACH (OUTPATIENT)
Dept: ADMINISTRATIVE | Facility: OTHER | Age: 56
End: 2021-07-06

## 2021-07-14 ENCOUNTER — HOSPITAL ENCOUNTER (OUTPATIENT)
Dept: CARDIOLOGY | Facility: HOSPITAL | Age: 56
Discharge: HOME | End: 2021-07-14
Attending: INTERNAL MEDICINE
Payer: COMMERCIAL

## 2021-07-14 VITALS
DIASTOLIC BLOOD PRESSURE: 82 MMHG | SYSTOLIC BLOOD PRESSURE: 118 MMHG | HEIGHT: 67 IN | BODY MASS INDEX: 30.61 KG/M2 | WEIGHT: 195 LBS | HEART RATE: 80 BPM

## 2021-07-14 DIAGNOSIS — R00.2 PALPITATIONS: ICD-10-CM

## 2021-07-14 LAB
ASCENDING AORTA: 3.11 CM
AV INDEX (PROSTH): 0.87
AV MEAN GRADIENT: 3 MMHG
AV PEAK GRADIENT: 5 MMHG
AV VALVE AREA: 2.66 CM2
AV VELOCITY RATIO: 0.86
BSA FOR ECHO PROCEDURE: 2.04 M2
CV ECHO LV RWT: 0.34 CM
DOP CALC AO PEAK VEL: 1.13 M/S
DOP CALC AO VTI: 23.42 CM
DOP CALC LVOT AREA: 3.1 CM2
DOP CALC LVOT DIAMETER: 1.98 CM
DOP CALC LVOT PEAK VEL: 0.97 M/S
DOP CALC LVOT STROKE VOLUME: 62.38 CM3
DOP CALCLVOT PEAK VEL VTI: 20.27 CM
E WAVE DECELERATION TIME: 157.17 MSEC
E/A RATIO: 0.7
E/E' RATIO: 6.2 M/S
ECHO LV POSTERIOR WALL: 0.74 CM (ref 0.6–1.1)
EJECTION FRACTION: 60 %
FRACTIONAL SHORTENING: 38 % (ref 28–44)
INTERVENTRICULAR SEPTUM: 0.71 CM (ref 0.6–1.1)
IVRT: 88.49 MSEC
LA MAJOR: 4.62 CM
LA MINOR: 4.56 CM
LA WIDTH: 3.56 CM
LEFT ATRIUM SIZE: 2.64 CM
LEFT ATRIUM VOLUME INDEX MOD: 18.7 ML/M2
LEFT ATRIUM VOLUME INDEX: 18.3 ML/M2
LEFT ATRIUM VOLUME MOD: 37.37 CM3
LEFT ATRIUM VOLUME: 36.67 CM3
LEFT INTERNAL DIMENSION IN SYSTOLE: 2.72 CM (ref 2.1–4)
LEFT VENTRICLE DIASTOLIC VOLUME INDEX: 43.77 ML/M2
LEFT VENTRICLE DIASTOLIC VOLUME: 87.54 ML
LEFT VENTRICLE MASS INDEX: 48 G/M2
LEFT VENTRICLE SYSTOLIC VOLUME INDEX: 13.7 ML/M2
LEFT VENTRICLE SYSTOLIC VOLUME: 27.42 ML
LEFT VENTRICULAR INTERNAL DIMENSION IN DIASTOLE: 4.4 CM (ref 3.5–6)
LEFT VENTRICULAR MASS: 96.29 G
LV LATERAL E/E' RATIO: 5.17 M/S
LV SEPTAL E/E' RATIO: 7.75 M/S
MV A" WAVE DURATION": 11.7 MSEC
MV PEAK A VEL: 0.88 M/S
MV PEAK E VEL: 0.62 M/S
MV STENOSIS PRESSURE HALF TIME: 45.58 MS
MV VALVE AREA P 1/2 METHOD: 4.83 CM2
PISA TR MAX VEL: 2.54 M/S
PULM VEIN S/D RATIO: 1.46
PV PEAK D VEL: 0.39 M/S
PV PEAK S VEL: 0.57 M/S
RA MAJOR: 3.9 CM
RA PRESSURE: 3 MMHG
RA WIDTH: 2.93 CM
RIGHT VENTRICULAR END-DIASTOLIC DIMENSION: 2.78 CM
RV TISSUE DOPPLER FREE WALL SYSTOLIC VELOCITY 1 (APICAL 4 CHAMBER VIEW): 10.15 CM/S
SINUS: 3.08 CM
STJ: 2.95 CM
TDI LATERAL: 0.12 M/S
TDI SEPTAL: 0.08 M/S
TDI: 0.1 M/S
TR MAX PG: 26 MMHG
TRICUSPID ANNULAR PLANE SYSTOLIC EXCURSION: 2.63 CM
TV REST PULMONARY ARTERY PRESSURE: 29 MMHG

## 2021-07-14 PROCEDURE — 93306 TTE W/DOPPLER COMPLETE: CPT | Mod: 26,,, | Performed by: INTERNAL MEDICINE

## 2021-07-14 PROCEDURE — 93306 TTE W/DOPPLER COMPLETE: CPT

## 2021-07-14 PROCEDURE — 93306 ECHO (CUPID ONLY): ICD-10-PCS | Mod: 26,,, | Performed by: INTERNAL MEDICINE

## 2021-07-30 ENCOUNTER — TELEPHONE (OUTPATIENT)
Dept: INTERNAL MEDICINE | Facility: CLINIC | Age: 56
End: 2021-07-30

## 2021-07-30 DIAGNOSIS — K04.7 DENTAL INFECTION: Primary | ICD-10-CM

## 2021-07-30 RX ORDER — AMOXICILLIN 500 MG/1
500 TABLET, FILM COATED ORAL EVERY 12 HOURS
Qty: 14 TABLET | Refills: 0 | Status: SHIPPED | OUTPATIENT
Start: 2021-07-30 | End: 2021-08-06

## 2021-09-27 DIAGNOSIS — B00.2 ORAL HERPES SIMPLEX INFECTION: ICD-10-CM

## 2021-09-27 RX ORDER — VALACYCLOVIR HYDROCHLORIDE 1 G/1
1000 TABLET, FILM COATED ORAL EVERY 8 HOURS PRN
Qty: 21 TABLET | Refills: 2 | Status: SHIPPED | OUTPATIENT
Start: 2021-09-27 | End: 2022-06-03

## 2021-10-08 DIAGNOSIS — E88.819 INSULIN RESISTANCE: ICD-10-CM

## 2021-10-08 DIAGNOSIS — R73.01 IFG (IMPAIRED FASTING GLUCOSE): ICD-10-CM

## 2021-10-08 RX ORDER — ORAL SEMAGLUTIDE 14 MG/1
14 TABLET ORAL DAILY
Qty: 30 TABLET | Refills: 5 | Status: SHIPPED | OUTPATIENT
Start: 2021-10-08 | End: 2021-12-29 | Stop reason: SDUPTHER

## 2021-12-15 ENCOUNTER — CLINICAL SUPPORT (OUTPATIENT)
Dept: INTERNAL MEDICINE | Facility: CLINIC | Age: 56
End: 2021-12-15
Payer: COMMERCIAL

## 2021-12-15 ENCOUNTER — OFFICE VISIT (OUTPATIENT)
Dept: INTERNAL MEDICINE | Facility: CLINIC | Age: 56
End: 2021-12-15
Payer: COMMERCIAL

## 2021-12-15 VITALS
HEIGHT: 67 IN | DIASTOLIC BLOOD PRESSURE: 68 MMHG | SYSTOLIC BLOOD PRESSURE: 94 MMHG | BODY MASS INDEX: 28.09 KG/M2 | HEART RATE: 83 BPM | WEIGHT: 179 LBS

## 2021-12-15 DIAGNOSIS — Z12.11 COLON CANCER SCREENING: Primary | ICD-10-CM

## 2021-12-15 DIAGNOSIS — Z12.39 ENCOUNTER FOR SCREENING FOR MALIGNANT NEOPLASM OF BREAST, UNSPECIFIED SCREENING MODALITY: ICD-10-CM

## 2021-12-15 DIAGNOSIS — Z00.00 ENCOUNTER FOR ANNUAL HEALTH EXAMINATION: ICD-10-CM

## 2021-12-15 DIAGNOSIS — Z00.00 ANNUAL PHYSICAL EXAM: Primary | ICD-10-CM

## 2021-12-15 DIAGNOSIS — Z12.4 CERVICAL CANCER SCREENING: ICD-10-CM

## 2021-12-15 LAB
ALBUMIN SERPL BCP-MCNC: 3.7 G/DL (ref 3.5–5.2)
ALP SERPL-CCNC: 75 U/L (ref 55–135)
ALT SERPL W/O P-5'-P-CCNC: 12 U/L (ref 10–44)
ANION GAP SERPL CALC-SCNC: 11 MMOL/L (ref 8–16)
AST SERPL-CCNC: 18 U/L (ref 10–40)
BILIRUB SERPL-MCNC: 0.5 MG/DL (ref 0.1–1)
BUN SERPL-MCNC: 16 MG/DL (ref 6–20)
CALCIUM SERPL-MCNC: 9.2 MG/DL (ref 8.7–10.5)
CHLORIDE SERPL-SCNC: 105 MMOL/L (ref 95–110)
CHOLEST SERPL-MCNC: 205 MG/DL (ref 120–199)
CHOLEST/HDLC SERPL: 4 {RATIO} (ref 2–5)
CO2 SERPL-SCNC: 22 MMOL/L (ref 23–29)
CREAT SERPL-MCNC: 0.9 MG/DL (ref 0.5–1.4)
ERYTHROCYTE [DISTWIDTH] IN BLOOD BY AUTOMATED COUNT: 13.3 % (ref 11.5–14.5)
EST. GFR  (AFRICAN AMERICAN): >60 ML/MIN/1.73 M^2
EST. GFR  (NON AFRICAN AMERICAN): >60 ML/MIN/1.73 M^2
ESTIMATED AVG GLUCOSE: 105 MG/DL (ref 68–131)
GLUCOSE SERPL-MCNC: 96 MG/DL (ref 70–110)
HBA1C MFR BLD: 5.3 % (ref 4–5.6)
HCT VFR BLD AUTO: 38.7 % (ref 37–48.5)
HDLC SERPL-MCNC: 51 MG/DL (ref 40–75)
HDLC SERPL: 24.9 % (ref 20–50)
HGB BLD-MCNC: 12.6 G/DL (ref 12–16)
HIV 1+2 AB+HIV1 P24 AG SERPL QL IA: NEGATIVE
LDLC SERPL CALC-MCNC: 143.6 MG/DL (ref 63–159)
MCH RBC QN AUTO: 29.7 PG (ref 27–31)
MCHC RBC AUTO-ENTMCNC: 32.6 G/DL (ref 32–36)
MCV RBC AUTO: 91 FL (ref 82–98)
NONHDLC SERPL-MCNC: 154 MG/DL
PLATELET # BLD AUTO: 253 K/UL (ref 150–450)
PMV BLD AUTO: 11.7 FL (ref 9.2–12.9)
POTASSIUM SERPL-SCNC: 4.9 MMOL/L (ref 3.5–5.1)
PROT SERPL-MCNC: 6.7 G/DL (ref 6–8.4)
RBC # BLD AUTO: 4.24 M/UL (ref 4–5.4)
SODIUM SERPL-SCNC: 138 MMOL/L (ref 136–145)
TRIGL SERPL-MCNC: 52 MG/DL (ref 30–150)
TSH SERPL DL<=0.005 MIU/L-ACNC: 1.92 UIU/ML (ref 0.4–4)
WBC # BLD AUTO: 6.08 K/UL (ref 3.9–12.7)

## 2021-12-15 PROCEDURE — 99999 PR PBB SHADOW E&M-EST. PATIENT-LVL III: CPT | Mod: PBBFAC,,, | Performed by: INTERNAL MEDICINE

## 2021-12-15 PROCEDURE — 87389 HIV-1 AG W/HIV-1&-2 AB AG IA: CPT | Performed by: INTERNAL MEDICINE

## 2021-12-15 PROCEDURE — 99396 PR PREVENTIVE VISIT,EST,40-64: ICD-10-PCS | Mod: S$GLB,,, | Performed by: INTERNAL MEDICINE

## 2021-12-15 PROCEDURE — 99396 PREV VISIT EST AGE 40-64: CPT | Mod: S$GLB,,, | Performed by: INTERNAL MEDICINE

## 2021-12-15 PROCEDURE — 83036 HEMOGLOBIN GLYCOSYLATED A1C: CPT | Performed by: INTERNAL MEDICINE

## 2021-12-15 PROCEDURE — 80061 LIPID PANEL: CPT | Performed by: INTERNAL MEDICINE

## 2021-12-15 PROCEDURE — 84443 ASSAY THYROID STIM HORMONE: CPT | Performed by: INTERNAL MEDICINE

## 2021-12-15 PROCEDURE — 99999 PR PBB SHADOW E&M-EST. PATIENT-LVL III: ICD-10-PCS | Mod: PBBFAC,,, | Performed by: INTERNAL MEDICINE

## 2021-12-15 PROCEDURE — 80053 COMPREHEN METABOLIC PANEL: CPT | Performed by: INTERNAL MEDICINE

## 2021-12-15 PROCEDURE — 85027 COMPLETE CBC AUTOMATED: CPT | Performed by: INTERNAL MEDICINE

## 2021-12-23 ENCOUNTER — PATIENT OUTREACH (OUTPATIENT)
Dept: ADMINISTRATIVE | Facility: OTHER | Age: 56
End: 2021-12-23
Payer: COMMERCIAL

## 2021-12-29 ENCOUNTER — OFFICE VISIT (OUTPATIENT)
Dept: BARIATRICS | Facility: CLINIC | Age: 56
End: 2021-12-29
Payer: COMMERCIAL

## 2021-12-29 VITALS
BODY MASS INDEX: 28.45 KG/M2 | OXYGEN SATURATION: 97 % | SYSTOLIC BLOOD PRESSURE: 121 MMHG | DIASTOLIC BLOOD PRESSURE: 74 MMHG | WEIGHT: 181.69 LBS | HEART RATE: 84 BPM

## 2021-12-29 DIAGNOSIS — R73.01 IFG (IMPAIRED FASTING GLUCOSE): ICD-10-CM

## 2021-12-29 DIAGNOSIS — E66.3 OVERWEIGHT (BMI 25.0-29.9): Primary | ICD-10-CM

## 2021-12-29 DIAGNOSIS — E88.819 INSULIN RESISTANCE: ICD-10-CM

## 2021-12-29 PROCEDURE — 3008F PR BODY MASS INDEX (BMI) DOCUMENTED: ICD-10-PCS | Mod: CPTII,S$GLB,, | Performed by: INTERNAL MEDICINE

## 2021-12-29 PROCEDURE — 3008F BODY MASS INDEX DOCD: CPT | Mod: CPTII,S$GLB,, | Performed by: INTERNAL MEDICINE

## 2021-12-29 PROCEDURE — 3074F PR MOST RECENT SYSTOLIC BLOOD PRESSURE < 130 MM HG: ICD-10-PCS | Mod: CPTII,S$GLB,, | Performed by: INTERNAL MEDICINE

## 2021-12-29 PROCEDURE — 3078F PR MOST RECENT DIASTOLIC BLOOD PRESSURE < 80 MM HG: ICD-10-PCS | Mod: CPTII,S$GLB,, | Performed by: INTERNAL MEDICINE

## 2021-12-29 PROCEDURE — 1159F MED LIST DOCD IN RCRD: CPT | Mod: CPTII,S$GLB,, | Performed by: INTERNAL MEDICINE

## 2021-12-29 PROCEDURE — 99212 OFFICE O/P EST SF 10 MIN: CPT | Mod: S$GLB,,, | Performed by: INTERNAL MEDICINE

## 2021-12-29 PROCEDURE — 3078F DIAST BP <80 MM HG: CPT | Mod: CPTII,S$GLB,, | Performed by: INTERNAL MEDICINE

## 2021-12-29 PROCEDURE — 3044F PR MOST RECENT HEMOGLOBIN A1C LEVEL <7.0%: ICD-10-PCS | Mod: CPTII,S$GLB,, | Performed by: INTERNAL MEDICINE

## 2021-12-29 PROCEDURE — 3044F HG A1C LEVEL LT 7.0%: CPT | Mod: CPTII,S$GLB,, | Performed by: INTERNAL MEDICINE

## 2021-12-29 PROCEDURE — 3074F SYST BP LT 130 MM HG: CPT | Mod: CPTII,S$GLB,, | Performed by: INTERNAL MEDICINE

## 2021-12-29 PROCEDURE — 99212 PR OFFICE/OUTPT VISIT, EST, LEVL II, 10-19 MIN: ICD-10-PCS | Mod: S$GLB,,, | Performed by: INTERNAL MEDICINE

## 2021-12-29 PROCEDURE — 1160F PR REVIEW ALL MEDS BY PRESCRIBER/CLIN PHARMACIST DOCUMENTED: ICD-10-PCS | Mod: CPTII,S$GLB,, | Performed by: INTERNAL MEDICINE

## 2021-12-29 PROCEDURE — 1159F PR MEDICATION LIST DOCUMENTED IN MEDICAL RECORD: ICD-10-PCS | Mod: CPTII,S$GLB,, | Performed by: INTERNAL MEDICINE

## 2021-12-29 PROCEDURE — 99999 PR PBB SHADOW E&M-EST. PATIENT-LVL III: ICD-10-PCS | Mod: PBBFAC,,, | Performed by: INTERNAL MEDICINE

## 2021-12-29 PROCEDURE — 99999 PR PBB SHADOW E&M-EST. PATIENT-LVL III: CPT | Mod: PBBFAC,,, | Performed by: INTERNAL MEDICINE

## 2021-12-29 PROCEDURE — 1160F RVW MEDS BY RX/DR IN RCRD: CPT | Mod: CPTII,S$GLB,, | Performed by: INTERNAL MEDICINE

## 2021-12-29 RX ORDER — ORAL SEMAGLUTIDE 14 MG/1
14 TABLET ORAL DAILY
Qty: 90 TABLET | Refills: 0 | Status: SHIPPED | OUTPATIENT
Start: 2021-12-29 | End: 2022-04-05

## 2022-01-05 ENCOUNTER — IMMUNIZATION (OUTPATIENT)
Dept: INTERNAL MEDICINE | Facility: CLINIC | Age: 57
End: 2022-01-05
Payer: COMMERCIAL

## 2022-01-05 DIAGNOSIS — Z23 NEED FOR VACCINATION: Primary | ICD-10-CM

## 2022-01-05 PROCEDURE — 0004A COVID-19, MRNA, LNP-S, PF, 30 MCG/0.3 ML DOSE VACCINE: CPT | Mod: CV19,PBBFAC | Performed by: INTERNAL MEDICINE

## 2022-03-11 ENCOUNTER — OFFICE VISIT (OUTPATIENT)
Dept: OBSTETRICS AND GYNECOLOGY | Facility: CLINIC | Age: 57
End: 2022-03-11
Payer: COMMERCIAL

## 2022-03-11 ENCOUNTER — PATIENT OUTREACH (OUTPATIENT)
Dept: ADMINISTRATIVE | Facility: OTHER | Age: 57
End: 2022-03-11
Payer: COMMERCIAL

## 2022-03-11 VITALS
DIASTOLIC BLOOD PRESSURE: 84 MMHG | BODY MASS INDEX: 29.69 KG/M2 | WEIGHT: 189.13 LBS | SYSTOLIC BLOOD PRESSURE: 124 MMHG | HEIGHT: 67 IN

## 2022-03-11 DIAGNOSIS — Z12.11 SCREEN FOR COLON CANCER: ICD-10-CM

## 2022-03-11 DIAGNOSIS — Z12.4 CERVICAL CANCER SCREENING: ICD-10-CM

## 2022-03-11 DIAGNOSIS — Z01.419 WELL WOMAN EXAM WITH ROUTINE GYNECOLOGICAL EXAM: Primary | ICD-10-CM

## 2022-03-11 PROCEDURE — 99386 PR PREVENTIVE VISIT,NEW,40-64: ICD-10-PCS | Mod: S$GLB,,, | Performed by: OBSTETRICS & GYNECOLOGY

## 2022-03-11 PROCEDURE — 99386 PREV VISIT NEW AGE 40-64: CPT | Mod: S$GLB,,, | Performed by: OBSTETRICS & GYNECOLOGY

## 2022-03-11 PROCEDURE — 3008F BODY MASS INDEX DOCD: CPT | Mod: CPTII,S$GLB,, | Performed by: OBSTETRICS & GYNECOLOGY

## 2022-03-11 PROCEDURE — 99999 PR PBB SHADOW E&M-EST. PATIENT-LVL III: ICD-10-PCS | Mod: PBBFAC,,, | Performed by: OBSTETRICS & GYNECOLOGY

## 2022-03-11 PROCEDURE — 88175 CYTOPATH C/V AUTO FLUID REDO: CPT | Performed by: OBSTETRICS & GYNECOLOGY

## 2022-03-11 PROCEDURE — 1159F PR MEDICATION LIST DOCUMENTED IN MEDICAL RECORD: ICD-10-PCS | Mod: CPTII,S$GLB,, | Performed by: OBSTETRICS & GYNECOLOGY

## 2022-03-11 PROCEDURE — 87624 HPV HI-RISK TYP POOLED RSLT: CPT | Performed by: OBSTETRICS & GYNECOLOGY

## 2022-03-11 PROCEDURE — 3074F SYST BP LT 130 MM HG: CPT | Mod: CPTII,S$GLB,, | Performed by: OBSTETRICS & GYNECOLOGY

## 2022-03-11 PROCEDURE — 1159F MED LIST DOCD IN RCRD: CPT | Mod: CPTII,S$GLB,, | Performed by: OBSTETRICS & GYNECOLOGY

## 2022-03-11 PROCEDURE — 3079F PR MOST RECENT DIASTOLIC BLOOD PRESSURE 80-89 MM HG: ICD-10-PCS | Mod: CPTII,S$GLB,, | Performed by: OBSTETRICS & GYNECOLOGY

## 2022-03-11 PROCEDURE — 99999 PR PBB SHADOW E&M-EST. PATIENT-LVL III: CPT | Mod: PBBFAC,,, | Performed by: OBSTETRICS & GYNECOLOGY

## 2022-03-11 PROCEDURE — 3079F DIAST BP 80-89 MM HG: CPT | Mod: CPTII,S$GLB,, | Performed by: OBSTETRICS & GYNECOLOGY

## 2022-03-11 PROCEDURE — 3074F PR MOST RECENT SYSTOLIC BLOOD PRESSURE < 130 MM HG: ICD-10-PCS | Mod: CPTII,S$GLB,, | Performed by: OBSTETRICS & GYNECOLOGY

## 2022-03-11 PROCEDURE — 3008F PR BODY MASS INDEX (BMI) DOCUMENTED: ICD-10-PCS | Mod: CPTII,S$GLB,, | Performed by: OBSTETRICS & GYNECOLOGY

## 2022-03-11 NOTE — PROGRESS NOTES
Health Maintenance Due   Topic Date Due    Cervical Cancer Screening  Never done    TETANUS VACCINE  Never done    Colorectal Cancer Screening  Never done    Shingles Vaccine (1 of 2) Never done     Updates were requested from care everywhere.  Chart was reviewed for overdue Proactive [facility] Encounters (CRIS) topics (CRS, Breast Cancer Screening, Eye exam)  Health Maintenance has been updated.  LINKS immunization registry triggered.  Immunizations were reconciled.

## 2022-03-11 NOTE — PROGRESS NOTES
History & Physical  Gynecology      SUBJECTIVE:     Chief Complaint: Well Woman       History of Present Illness:  Annual Exam-Postmenopausal  Patient presents for annual exam. The patient has no complaints today. Patient denies post-menopausal vaginal bleeding. The patient is not sexually active. The patient is not taking hormone replacement therapy.  The patient participates in regular exercise: yes.  She does not smoke.     GYN screening history: last pap: approximate date >3 years ago and was normal  Mammogram history: ordered  Colonoscopy history: due  Dexa history: n/a    FH:   Breast cancer: none  Colon cancer: none  Ovarian cancer: none    Review of patient's allergies indicates:  No Known Allergies    Past Medical History:   Diagnosis Date    History of cold sores     Hyperlipidemia     IFG (impaired fasting glucose) 2019    Increased body mass index (BMI)      Past Surgical History:   Procedure Laterality Date    APPENDECTOMY       SECTION       OB History        2    Para   2    Term   2            AB        Living           SAB        IAB        Ectopic        Multiple        Live Births                   Family History   Problem Relation Age of Onset    Ulcers Mother         Duodenal ulcer    Diabetes type II Father     Colon cancer Neg Hx      Social History     Tobacco Use    Smoking status: Never Smoker    Smokeless tobacco: Never Used   Substance Use Topics    Alcohol use: Yes     Comment: socially    Drug use: Never       Current Outpatient Medications   Medication Sig    calcium carbonate (OS-SALLY) 500 mg calcium (1,250 mg) tablet Take 1 tablet by mouth once daily.    semaglutide (RYBELSUS) 14 mg tablet Take 1 tablet (14 mg total) by mouth once daily.    amoxicillin (AMOXIL) 500 MG capsule Take 1 capsule by mouth three times daily until finished. (Patient not taking: Reported on 3/11/2022)    valACYclovir (VALTREX) 1000 MG tablet Take 1 tablet  (1,000 mg total) by mouth every 8 (eight) hours as needed (For cold sores. Take for 7 days.).     No current facility-administered medications for this visit.       Review of Systems:  Review of Systems   Constitutional: Negative for appetite change, fever and unexpected weight change.   Respiratory: Negative for shortness of breath.    Cardiovascular: Negative for chest pain.   Gastrointestinal: Negative for nausea and vomiting.   Genitourinary: Negative for pelvic pain, vaginal bleeding, vaginal discharge, vaginal pain and postmenopausal bleeding.   Integumentary:  Negative for breast mass.   Breast: Negative for lump and mass       OBJECTIVE:     Physical Exam:  Physical Exam  Vitals and nursing note reviewed.   Constitutional:       Appearance: She is well-developed.   Neck:      Thyroid: No thyromegaly.      Trachea: No tracheal deviation.   Cardiovascular:      Rate and Rhythm: Normal rate and regular rhythm.      Heart sounds: Normal heart sounds.   Pulmonary:      Effort: Pulmonary effort is normal.      Breath sounds: Normal breath sounds.   Chest:   Breasts: Breasts are symmetrical.      Right: No inverted nipple, mass, nipple discharge, skin change or tenderness.      Left: No inverted nipple, mass, nipple discharge, skin change or tenderness.       Abdominal:      Palpations: Abdomen is soft.   Genitourinary:     General: Normal vulva.      Labia:         Right: No rash, tenderness, lesion or injury.         Left: No rash, tenderness, lesion or injury.       Urethra: No prolapse, urethral pain, urethral swelling or urethral lesion.      Vagina: Normal. No signs of injury and foreign body. No vaginal discharge, erythema, tenderness or bleeding.      Cervix: No cervical motion tenderness, discharge or friability.      Uterus: Not deviated, not enlarged, not fixed and not tender.       Adnexa:         Right: No mass, tenderness or fullness.          Left: No mass, tenderness or fullness.        Rectum: No  anal fissure or external hemorrhoid.      Comments: Urethral meatus: normal size, anterior vaginal wall with no prolapse, no lesions  Bladder: no fullness, masses or tenderness  Musculoskeletal:      Cervical back: Normal range of motion and neck supple.   Neurological:      Mental Status: She is alert and oriented to person, place, and time.   Psychiatric:         Behavior: Behavior normal.         Thought Content: Thought content normal.         Judgment: Judgment normal.         Chaperoned by: Siobhan    ASSESSMENT:       ICD-10-CM ICD-9-CM    1. Well woman exam with routine gynecological exam  Z01.419 V72.31 Liquid-Based Pap Smear, Screening      HPV High Risk Genotypes, PCR   2. Cervical cancer screening  Z12.4 V76.2 Ambulatory referral/consult to Gynecology   3. Screen for colon cancer  Z12.11 V76.51 Cologuard Screening (Multitarget Stool DNA)      Cologuard Screening (Multitarget Stool DNA)          Plan:      Celi was seen today for well woman.    Diagnoses and all orders for this visit:    Well woman exam with routine gynecological exam  -     Liquid-Based Pap Smear, Screening  -     HPV High Risk Genotypes, PCR    Cervical cancer screening  -     Ambulatory referral/consult to Gynecology    Screen for colon cancer  -     Cologuard Screening (Multitarget Stool DNA); Future  -     Cologuard Screening (Multitarget Stool DNA)        Orders Placed This Encounter   Procedures    HPV High Risk Genotypes, PCR    Cologuard Screening (Multitarget Stool DNA)       Well Woman:   - Pap smear: and hpv done today  - Mammogram: scheduled  - Colonoscopy: cologuard ordered  - Dexa: due age 65; may want earlier due to early menopause  - Immunizations: s/p covid vaccine  - Labs: with pcp  - Exercise recommended    Follow up in one year for annual, or prn.    Mariam Longoria

## 2022-03-17 LAB
FINAL PATHOLOGIC DIAGNOSIS: NORMAL
HPV HR 12 DNA SPEC QL NAA+PROBE: NEGATIVE
HPV16 AG SPEC QL: NEGATIVE
HPV18 DNA SPEC QL NAA+PROBE: NEGATIVE
Lab: NORMAL

## 2022-04-05 ENCOUNTER — OFFICE VISIT (OUTPATIENT)
Dept: BARIATRICS | Facility: CLINIC | Age: 57
End: 2022-04-05
Payer: COMMERCIAL

## 2022-04-05 VITALS
BODY MASS INDEX: 29.1 KG/M2 | HEART RATE: 91 BPM | HEIGHT: 67 IN | WEIGHT: 185.38 LBS | DIASTOLIC BLOOD PRESSURE: 72 MMHG | SYSTOLIC BLOOD PRESSURE: 110 MMHG | OXYGEN SATURATION: 97 %

## 2022-04-05 DIAGNOSIS — E66.3 OVERWEIGHT (BMI 25.0-29.9): Primary | ICD-10-CM

## 2022-04-05 PROCEDURE — 3008F PR BODY MASS INDEX (BMI) DOCUMENTED: ICD-10-PCS | Mod: CPTII,S$GLB,, | Performed by: INTERNAL MEDICINE

## 2022-04-05 PROCEDURE — 1159F MED LIST DOCD IN RCRD: CPT | Mod: CPTII,S$GLB,, | Performed by: INTERNAL MEDICINE

## 2022-04-05 PROCEDURE — 99999 PR PBB SHADOW E&M-EST. PATIENT-LVL III: CPT | Mod: PBBFAC,,, | Performed by: INTERNAL MEDICINE

## 2022-04-05 PROCEDURE — 3074F SYST BP LT 130 MM HG: CPT | Mod: CPTII,S$GLB,, | Performed by: INTERNAL MEDICINE

## 2022-04-05 PROCEDURE — 1160F RVW MEDS BY RX/DR IN RCRD: CPT | Mod: CPTII,S$GLB,, | Performed by: INTERNAL MEDICINE

## 2022-04-05 PROCEDURE — 3074F PR MOST RECENT SYSTOLIC BLOOD PRESSURE < 130 MM HG: ICD-10-PCS | Mod: CPTII,S$GLB,, | Performed by: INTERNAL MEDICINE

## 2022-04-05 PROCEDURE — 1160F PR REVIEW ALL MEDS BY PRESCRIBER/CLIN PHARMACIST DOCUMENTED: ICD-10-PCS | Mod: CPTII,S$GLB,, | Performed by: INTERNAL MEDICINE

## 2022-04-05 PROCEDURE — 3008F BODY MASS INDEX DOCD: CPT | Mod: CPTII,S$GLB,, | Performed by: INTERNAL MEDICINE

## 2022-04-05 PROCEDURE — 1159F PR MEDICATION LIST DOCUMENTED IN MEDICAL RECORD: ICD-10-PCS | Mod: CPTII,S$GLB,, | Performed by: INTERNAL MEDICINE

## 2022-04-05 PROCEDURE — 3078F DIAST BP <80 MM HG: CPT | Mod: CPTII,S$GLB,, | Performed by: INTERNAL MEDICINE

## 2022-04-05 PROCEDURE — 99213 OFFICE O/P EST LOW 20 MIN: CPT | Mod: S$GLB,,, | Performed by: INTERNAL MEDICINE

## 2022-04-05 PROCEDURE — 99213 PR OFFICE/OUTPT VISIT, EST, LEVL III, 20-29 MIN: ICD-10-PCS | Mod: S$GLB,,, | Performed by: INTERNAL MEDICINE

## 2022-04-05 PROCEDURE — 99999 PR PBB SHADOW E&M-EST. PATIENT-LVL III: ICD-10-PCS | Mod: PBBFAC,,, | Performed by: INTERNAL MEDICINE

## 2022-04-05 PROCEDURE — 3078F PR MOST RECENT DIASTOLIC BLOOD PRESSURE < 80 MM HG: ICD-10-PCS | Mod: CPTII,S$GLB,, | Performed by: INTERNAL MEDICINE

## 2022-04-05 RX ORDER — SEMAGLUTIDE 1.34 MG/ML
0.5 INJECTION, SOLUTION SUBCUTANEOUS
Qty: 1 PEN | Refills: 0 | Status: SHIPPED | OUTPATIENT
Start: 2022-04-05 | End: 2022-05-04

## 2022-04-05 NOTE — PROGRESS NOTES
"  Subjective:       Patient ID: Celi Bermeo is a 57 y.o. female.    Chief Complaint: Follow-up    Pt here today for follow-up. Has lost 16.3 lbs (-3.5 lbs muscle. -12.6 lbs fat).  . Started IF a with topiramate and continuing Rybelsus. Has been on topiramate 25 mg daily. SHe has had a lot of special occasions and was not as consistent with eating changes. Has  been able to swim at least once a week. Tries to get 150 min a week.       New BMR: 1399    New PBF: 46.2%      initial  BMR: 1472    PBF:  44.3%    Follow-up  Pertinent negatives include no arthralgias, chest pain, chills or fever.     Review of Systems   Constitutional: Negative for chills and fever.   Respiratory: Negative for shortness of breath.         + snores.    Cardiovascular: Positive for palpitations. Negative for chest pain and leg swelling.   Gastrointestinal: Negative for constipation, diarrhea and reflux.   Genitourinary: Negative for difficulty urinating and dysuria.   Musculoskeletal: Negative for arthralgias and back pain.   Neurological: Negative for dizziness and light-headedness.   Psychiatric/Behavioral: Negative for dysphoric mood. The patient is not nervous/anxious.          Objective:     /72 (BP Location: Left arm, Patient Position: Sitting, BP Method: Large (Manual))   Pulse 91   Ht 5' 7" (1.702 m)   Wt 84.1 kg (185 lb 6.4 oz)   SpO2 97%   BMI 29.04 kg/m²    Physical Exam  Vitals reviewed.   Constitutional:       General: She is not in acute distress.     Appearance: She is well-developed. She is obese.   HENT:      Head: Normocephalic and atraumatic.   Eyes:      General: No scleral icterus.     Pupils: Pupils are equal, round, and reactive to light.   Cardiovascular:      Rate and Rhythm: Normal rate.   Pulmonary:      Effort: Pulmonary effort is normal. No respiratory distress.   Abdominal:      Palpations: Abdomen is soft.   Musculoskeletal:         General: Normal range of motion.      Cervical back: Normal " range of motion and neck supple.   Skin:     General: Skin is warm and dry.      Findings: No erythema.   Neurological:      Mental Status: She is alert and oriented to person, place, and time.      Cranial Nerves: No cranial nerve deficit.   Psychiatric:         Behavior: Behavior normal.         Judgment: Judgment normal.         Assessment:       1. Overweight (BMI 25.0-29.9)        Plan:         Celi was seen today for follow-up.    Diagnoses and all orders for this visit:    Overweight (BMI 25.0-29.9)    Other orders  -     semaglutide (OZEMPIC) 0.25 mg or 0.5 mg(2 mg/1.5 mL) pen injector; Inject 0.5 mg into the skin every 7 days.           Start Ozempic once a week. Start with 0.25mg once a week x 4 weeks, then 0.5 mg weekly.       Decrease portions as soon as you start Ozempic. Avoid fried, greasy, fatty foods.     Some nausea in the first 2 weeks is not unusual.     If you get pain across the upper abdomen and around to your back, please call the office.       [email or web address] for coupon/videos.         Let us know at the end of the 6 weeks if you want to continue 0.5 mg or to increase to 1 mg.           3 meals a day made up of the following:  Unlimited green vegetables, tomatoes, mushrooms, spaghetti squash, cauliflower,   3-4 oz serving Meat, poultry, seafood, eggs hard cheeses or plant based protein with each meal.    Milk and plain yogurt  Dressings, seasonings, condiments, etc should have less than 2 g sugars.   Beans (1-1.5 cups) or nuts (1/4 cup) can have 1 x a day.   1-2 servings of citrus fruits, berries, pineapple or melon a day (1/2 cup)  Avoid fried foods    Avoid/limit grains, rice, pasta, potatoes, bread, corn, peas, oatmeal, grits, tortillas, crackers, chips    No soda, sweet tea, juices or lemonade    [email or web address].dietdoctor.[email or web address] for recipes. Moderate carb intake    Add some type of resistance training 2-3 days a week. These can be body weight exercises, light weight or elastic bands. Youtube  and [employer] are great sources for free work out plans and videos.       SPring recipes given.

## 2022-04-05 NOTE — PATIENT INSTRUCTIONS
"Start Ozempic once a week. Start with 0.25mg once a week x 4 weeks, then 0.5 mg weekly.       Decrease portions as soon as you start Ozempic. Avoid fried, greasy, fatty foods.     Some nausea in the first 2 weeks is not unusual.     If you get pain across the upper abdomen and around to your back, please call the office.       [email or web address] for coupon/videos.     Let us know at the end of the 6 weeks if you want to continue 0.5 mg or to increase to 1 mg.         3 meals a day made up of the following:  Unlimited green vegetables, tomatoes, mushrooms, spaghetti squash, cauliflower,   3-4 oz serving Meat, poultry, seafood, eggs hard cheeses or plant based protein with each meal.    Milk and plain yogurt  Dressings, seasonings, condiments, etc should have less than 2 g sugars.   Beans (1-1.5 cups) or nuts (1/4 cup) can have 1 x a day.   1-2 servings of citrus fruits, berries, pineapple or melon a day (1/2 cup)  Avoid fried foods    Avoid/limit grains, rice, pasta, potatoes, bread, corn, peas, oatmeal, grits, tortillas, crackers, chips    No soda, sweet tea, juices or lemonade    [email or web address] for recipes. Moderate carb intake    Add some type of resistance training 2-3 days a week. These can be body weight exercises, light weight or elastic bands. [employer] and [employer] are great sources for free work out plans and videos.         Spring Recipes:    Point Baker Shake:     Ingredients  ½ cup low fat cottage cheese  ¼ cup vanilla protein powder  1/8 tsp mint extract  2-3 packets of stevia or artificial sweetener of choice  5-10 ice cubes (more or less depending on how thick you would like it)  4 oz of water  2-3 drops of green food coloring OR a small handful of spinach to make it green    Put all ingredients in  and  until desired consistency.      "Unstuffed" Cabbage Rolls:    Ingredients:  1 1/2 to 2 pounds lean ground beef or turkey  1 large onion, chopped  1 clove garlic, minced  1 small " cabbage, chopped  2 cans (14.5 ounces each) diced tomatoes  1 can (8 ounces) tomato sauce  ¼ - ½ cup water depending on desired consistency  1 teaspoon ground black pepper, salt to taste    Spray large skillet with nonstick cookspray. Heat pan on medium heat. Sauté the onions until tender, and then add the ground beef (or turkey) and cook until the meat is browned.    Add the garlic, cook an additional minute before adding the remaining ingredients. Cover, reduce the heat and simmer about 25 minutes (or until the cabbage is  fork tender)        Not so Conner's Pie:    Ingredients  2 (or more) pounds of lean ground beef, or turkey  2 heads of cauliflower or 3 bags of frozen cauliflower, chopped  1 bag of frozen mixed veggies          (NO Corn, Peas or Potatoes!)  1-2 onions  1 egg  1 teaspoon each of basil, garlic powder, pepper, oregano and a little cayenne  4-5 sprays of I cant believe its not butter spray  4 ounces of fat free cream cheese (optional)  Low fat Cheese to top (optional)    Roast cauliflower in oven on 350* F for about 15-20 minutes, until browned and fork tender. (begin ag meat while cauliflower is cooking). Place cooked cauliflower in . Add 4 ounces of fat free cream cheese, 4-5 sprays of I cant believe its not butter SPRAY, and spices and puree until creamy.     While cauliflower is roasting, brown meat in large skillet and season to taste. Set aside. Sauté diced onion in skillet until somewhat soft. Set aside with meat. Pour mixed veggies in the skillet to heat on low heat.     Mix the meat, onions, mixed veggies, raw egg and any additional seasonings and put in bottom of 9x13 baking dish. Spread mashed cauliflower mixture over it until smooth.    Bake at 350 for approximately 30 minutes. Add cheese and bake 5 additional minutes (optional). Serve warm (or reheat later).    Crock Pot Balsamic Pork Tenderloin:    Ingredients:  1 tsp dried thyme  1 tsp ground pepper  ½ tsp salt  3  tbsp dried minced onion  2 tsp dried basil  ¼ cup low sodium broth  ½ cup balsamic vinegar  2 cloves garlic, minced  2 lbs Pork Tenderloin    Mix first 5 ingredients together. Rub pork tenderloin with dry seasoning mixture.  In a large sauté pan, heat ¼ cup balsamic vinegar and garlic on medium heat. Add pork to sauté gonzalez and sear, ag all sides. Add low sodium broth, 1 tbsp at a time to keep tenderloin from sticking or use nonstick cookspray.     Transfer meat to crock pot. Pour remaining balsamic vinegar into sauté pan and continue  to stir for a few minutes to deglaze the pan. Pour juices over the top of the tenderloin in crockpot. Cook on high for 3 hours or until meat thermometer says 170*. Let rest 5-10 minutes and then slice.       Side Dish: Steamed carrots w/ garlic and torsten    Ingredients:  2 garlic cloves, minced  1 lb baby carrots  5-6 sprays of I cant believe its not butter SPRAY  1 tsp minced peeled fresh torsten  1 tbsp chopped fresh cilantro  ½ tsp grated lime rind  1 tbsp fresh lime juice   ¼ tsp salt    Prepare garlic; let stand 10 minutes. Steam carrots, covered in pot, about 10 minutes or until tender.     In a nonstick skillet over medium heat, spray pan w/ I cant believe its not butter SPRAY. Add garlic and torsten and cook 1 minute, stirring constantly. Remove from  heat; stir in carrots, cilantro and remaining ingredients.         Side Dish: Green Bean Almondine    Ingredients:  1 pound fresh green beans, trimmed  1 tbsp rojas vinegar  1 ½ tsp water  1 tsp Prince Mustard  ¼ tsp salt  ¼ tsp freshly ground black pepper  1 tbsp sliced almonds, toasted    Cook green beans in boiling water for 4 minutes or until crisp-tender. Drain and plunge beans into ice water. Drain well. Pat beans dry w/ paper towel.     Combine vinegar, water, mustard, salt and pepper in a medium bowl. Add beans to vinegar mixture; toss to coat well. Sprinkle with toasted almonds.      How to Cook the Perfect Hard  Boiled Egg:    Steam in water: Fill a large pot with 1 inch of water. Place steamer insert inside, cover, and bring to a boil over high heat. Add eggs to steamer basket, cover, and continue cooking 12 minute. If serving cold, immediately place eggs in a bowl of ice water and allow to cool for at least 15 minutes before peeling under cool running water. Store in the refrigerator for up to 5 days.    OR    Purchase Dash Go Rapid Egg Boiler: available @ [employer], [employer], [employer], [employer] for ~$15-$20      Classic Egg Salad Recipe:    Ingredients:  8 hard cooked eggs, peeled and coarsely chopped  4-6 tbsp of low fat or fat free pena  2 tbsp celery, chopped  2 tsp miguel mustard  Dash of cayenne pepper (for spice)  Black pepper, salt to taste    In a medium bowl, combine pena, celery, mustard and cayenne pepper with chopped eggs. Season w/ pepper and salt. Serve over Lettuce leaves.     Get Creative! Add chopped turkey awan and tomato and serve on lettuce leaves for an egg-cellent BLT egg salad or mix lean diced ham, low fat cheddar and tomatoes for  egg salad    Tuna Deviled Eggs:    Ingredients:  12 hard boiled eggs  1 can of tuna packed in water  1 rib celery, diced  ¼ cup low fat pena  1 tsp mustard  Garlic powder, black pepper to taste  Dash of paprika (for finish)    Cut eggs in half lengthwise. Remove yolk and mash in bowl. In separate bowl, mix tuna, celery, low fat pena, mustard and seasonings.  Stir in egg yolks until blended. Stuff eggs and sprinkle dash of paprika      Awan Jalapeno Deviled Eggs:    Ingredients:  12 hard boiled eggs, peeled  ½ cup low fat pena  1 ½ tsp rice vinegar  ¾ tsp ground mustard  ½ packet splenda  2 jalapenos, seeded and chopped, 6 pieces turkey awan, cooked crisp and crumbled  Dash of paprika (for finish)    Cut eggs in half lengthwise. Remove yolk and mash in bowl. Add pena, vinegar, spices and Splenda until well combined. Mix in jalapenos and awan. Stuff eggs  and sprinkle w/ dash of paprika      Sugar-Free High Protein Brownie Recipe:    Ingredients:  2 cups of pureed zucchini  4 oz fat free cream cheese  1 large egg  2 scoops chocolate protein powder  1 tbsp pure vanilla extract  1/3 cup unsweetened cocoa powder    ¼ tsp salt  2 tbsp chopped nuts  *8-9 packets of splenda or artificial sweetener of choice    Preheat oven to 350* F.     Line an 8x8 pan w/ parchment paper or spray with nonstick cookspray.     In a medium bowl, blend the fat free cream cheese with egg until creamy. Add chocolate protein powder and cocoa powder until creamy. Add vanilla extract. Stir in pureed zucchini and salt.     The batter will be thick, but not dry. If batter seems dry, add 1-2 tbsp water. Fold in Nuts. Pour batter in prepared pan.     Bake for 30 minutes. Allow to cool completely. Cut into squares and chill to semi-set.     *best if eaten within 2 days but may last 3-4 days in fridge.         Lemon Whip:    Ingredients:  Small box of sugar-free vanilla instant pudding mix  1 small packet of crystal light lemonade mix  2 cups skim milk or fat free fairlife milk  Sugar-free, fat free cool whip     Make sugar-free pudding using 2 cups skim milk according to package. Stir in 1 small packet of crystal light lemonade mix.  Fold in a dollop of sugar-free, fat free cool whip and stir to combine.     Home-made Sugar-free Pudding Pops:    Ingredients:  1 small pkg of sugar-free instant pudding mix (flavor of choice!)  2 cups fat free milk     Beat ingredients with whisk for 2 minutes. Pour into 6 paper or plastic cups or into a popsicle mold. Insert wooden pop stick in center of each cup.     Freeze for 5 hours or until firm. Peel off paper or pull out of popsicle mold.     Variations: add sugar-free syrups to change up the flavor, such as sugar-free chocolate pudding + sugar free raspberry syrup = chocolate raspberry fudgesicle.

## 2022-04-06 ENCOUNTER — HOSPITAL ENCOUNTER (OUTPATIENT)
Dept: RADIOLOGY | Facility: HOSPITAL | Age: 57
Discharge: HOME | End: 2022-04-06
Attending: INTERNAL MEDICINE
Payer: COMMERCIAL

## 2022-04-06 VITALS — BODY MASS INDEX: 28.19 KG/M2 | WEIGHT: 180 LBS

## 2022-04-06 DIAGNOSIS — Z12.39 ENCOUNTER FOR SCREENING FOR MALIGNANT NEOPLASM OF BREAST, UNSPECIFIED SCREENING MODALITY: ICD-10-CM

## 2022-04-06 PROCEDURE — 77067 SCR MAMMO BI INCL CAD: CPT | Mod: 26,,, | Performed by: RADIOLOGY

## 2022-04-06 PROCEDURE — 77063 BREAST TOMOSYNTHESIS BI: CPT | Mod: 26,,, | Performed by: RADIOLOGY

## 2022-04-06 PROCEDURE — 77063 BREAST TOMOSYNTHESIS BI: CPT | Mod: TC

## 2022-04-06 PROCEDURE — 77067 SCR MAMMO BI INCL CAD: CPT | Mod: TC

## 2022-04-06 PROCEDURE — 77067 MAMMO DIGITAL SCREENING BILAT WITH TOMO: ICD-10-PCS | Mod: 26,,, | Performed by: RADIOLOGY

## 2022-04-06 PROCEDURE — 77063 MAMMO DIGITAL SCREENING BILAT WITH TOMO: ICD-10-PCS | Mod: 26,,, | Performed by: RADIOLOGY

## 2022-04-11 ENCOUNTER — PATIENT MESSAGE (OUTPATIENT)
Dept: ADMINISTRATIVE | Facility: HOSPITAL | Age: 57
End: 2022-04-11
Payer: COMMERCIAL

## 2022-04-13 LAB — NONINV COLON CA DNA+OCC BLD SCRN STL QL: NEGATIVE

## 2022-05-04 RX ORDER — SEMAGLUTIDE 1.34 MG/ML
1 INJECTION, SOLUTION SUBCUTANEOUS
Qty: 3 PEN | Refills: 0 | Status: SHIPPED | OUTPATIENT
Start: 2022-05-04 | End: 2022-07-18 | Stop reason: SDUPTHER

## 2022-06-03 DIAGNOSIS — B00.2 ORAL HERPES SIMPLEX INFECTION: ICD-10-CM

## 2022-06-03 RX ORDER — VALACYCLOVIR HYDROCHLORIDE 1 G/1
1000 TABLET, FILM COATED ORAL EVERY 8 HOURS PRN
Qty: 21 TABLET | Refills: 5 | Status: SHIPPED | OUTPATIENT
Start: 2022-06-03 | End: 2023-02-12 | Stop reason: SDUPTHER

## 2022-07-18 RX ORDER — SEMAGLUTIDE 1.34 MG/ML
1 INJECTION, SOLUTION SUBCUTANEOUS
Qty: 3 PEN | Refills: 0 | Status: SHIPPED | OUTPATIENT
Start: 2022-07-18 | End: 2022-11-30

## 2022-08-03 ENCOUNTER — PATIENT MESSAGE (OUTPATIENT)
Dept: BARIATRICS | Facility: CLINIC | Age: 57
End: 2022-08-03
Payer: COMMERCIAL

## 2022-08-04 ENCOUNTER — CLINICAL SUPPORT (OUTPATIENT)
Dept: AUDIOLOGY | Facility: CLINIC | Age: 57
End: 2022-08-04
Payer: COMMERCIAL

## 2022-08-04 ENCOUNTER — OFFICE VISIT (OUTPATIENT)
Dept: OTOLARYNGOLOGY | Facility: CLINIC | Age: 57
End: 2022-08-04
Payer: COMMERCIAL

## 2022-08-04 VITALS — HEART RATE: 92 BPM | SYSTOLIC BLOOD PRESSURE: 113 MMHG | DIASTOLIC BLOOD PRESSURE: 80 MMHG

## 2022-08-04 DIAGNOSIS — H93.293 ABNORMAL AUDITORY PERCEPTION OF BOTH EARS: Primary | ICD-10-CM

## 2022-08-04 DIAGNOSIS — H61.22 IMPACTED CERUMEN OF LEFT EAR: ICD-10-CM

## 2022-08-04 DIAGNOSIS — Z01.10 NORMAL HEARING EXAM: Primary | ICD-10-CM

## 2022-08-04 PROCEDURE — 92557 COMPREHENSIVE HEARING TEST: CPT | Mod: S$GLB,,,

## 2022-08-04 PROCEDURE — 99999 PR PBB SHADOW E&M-EST. PATIENT-LVL III: ICD-10-PCS | Mod: PBBFAC,,, | Performed by: NURSE PRACTITIONER

## 2022-08-04 PROCEDURE — 99999 PR PBB SHADOW E&M-EST. PATIENT-LVL I: CPT | Mod: PBBFAC,,,

## 2022-08-04 PROCEDURE — 92567 PR TYMPA2METRY: ICD-10-PCS | Mod: S$GLB,,,

## 2022-08-04 PROCEDURE — 3074F SYST BP LT 130 MM HG: CPT | Mod: CPTII,S$GLB,, | Performed by: NURSE PRACTITIONER

## 2022-08-04 PROCEDURE — 3079F PR MOST RECENT DIASTOLIC BLOOD PRESSURE 80-89 MM HG: ICD-10-PCS | Mod: CPTII,S$GLB,, | Performed by: NURSE PRACTITIONER

## 2022-08-04 PROCEDURE — 69210 EAR CERUMEN REMOVAL: ICD-10-PCS | Mod: S$GLB,,, | Performed by: NURSE PRACTITIONER

## 2022-08-04 PROCEDURE — 99202 OFFICE O/P NEW SF 15 MIN: CPT | Mod: 25,S$GLB,, | Performed by: NURSE PRACTITIONER

## 2022-08-04 PROCEDURE — 92567 TYMPANOMETRY: CPT | Mod: S$GLB,,,

## 2022-08-04 PROCEDURE — 99202 PR OFFICE/OUTPT VISIT, NEW, LEVL II, 15-29 MIN: ICD-10-PCS | Mod: 25,S$GLB,, | Performed by: NURSE PRACTITIONER

## 2022-08-04 PROCEDURE — 69210 REMOVE IMPACTED EAR WAX UNI: CPT | Mod: S$GLB,,, | Performed by: NURSE PRACTITIONER

## 2022-08-04 PROCEDURE — 3074F PR MOST RECENT SYSTOLIC BLOOD PRESSURE < 130 MM HG: ICD-10-PCS | Mod: CPTII,S$GLB,, | Performed by: NURSE PRACTITIONER

## 2022-08-04 PROCEDURE — 3079F DIAST BP 80-89 MM HG: CPT | Mod: CPTII,S$GLB,, | Performed by: NURSE PRACTITIONER

## 2022-08-04 PROCEDURE — 92557 PR COMPREHENSIVE HEARING TEST: ICD-10-PCS | Mod: S$GLB,,,

## 2022-08-04 PROCEDURE — 1159F MED LIST DOCD IN RCRD: CPT | Mod: CPTII,S$GLB,, | Performed by: NURSE PRACTITIONER

## 2022-08-04 PROCEDURE — 99999 PR PBB SHADOW E&M-EST. PATIENT-LVL I: ICD-10-PCS | Mod: PBBFAC,,,

## 2022-08-04 PROCEDURE — 99999 PR PBB SHADOW E&M-EST. PATIENT-LVL III: CPT | Mod: PBBFAC,,, | Performed by: NURSE PRACTITIONER

## 2022-08-04 PROCEDURE — 1159F PR MEDICATION LIST DOCUMENTED IN MEDICAL RECORD: ICD-10-PCS | Mod: CPTII,S$GLB,, | Performed by: NURSE PRACTITIONER

## 2022-08-04 NOTE — PROGRESS NOTES
Subjective:      Celi Bermeo is a 57 y.o. female who presents for hearing and ear check.     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  She denies any hearing loss, otalgia, otorrhea, ear fullness/pressure, tinnitus or vertigo.  She reports that her PCP advised an ear cleaning for cerumen, but she denies any symptoms related to this.  She tried Debrox drops at home for this problem, but is unsure if they were successful.  There is not a family history of hearing loss at a young age.  There is not a prior history of ear surgery.  There is not a prior history of ear infections.  There is not a history of ear trauma.  She denies a history of significant noise exposure.  She does not wear hearing aids currently.  She has not had a hearing test recently.      Past Medical History  She has a past medical history of History of cold sores, Hyperlipidemia, IFG (impaired fasting glucose), and Increased body mass index (BMI).    Past Surgical History  She has a past surgical history that includes  section () and Appendectomy ().    Family History  Her family history includes Diabetes type II in her father; Ulcers in her mother.    Social History  She reports that she has never smoked. She has never used smokeless tobacco. She reports current alcohol use. She reports that she does not use drugs.    Allergies  She has No Known Allergies.    Medications  She has a current medication list which includes the following prescription(s): amoxicillin, calcium carbonate, ozempic, and valacyclovir.    Objective:   /80   Pulse 92      Constitutional:   She is oriented to person, place, and time. She appears well-developed and well-nourished. She appears alert. She is cooperative.  Non-toxic appearance. She does not have a sickly appearance. She does not appear ill. Normal speech.      Head:  Normocephalic and atraumatic. Not macrocephalic and not microcephalic. Head is  without raccoon's eyes, without Baker's sign, without abrasion, without laceration, without right periorbital erythema, without left periorbital erythema and without TMJ tenderness.     Ears:    Right Ear: No lacerations. No drainage, swelling or tenderness. No foreign bodies. No mastoid tenderness. Tympanic membrane is not injected, not scarred, not perforated, not erythematous, not retracted and not bulging. No middle ear effusion. No hemotympanum.   Left Ear: No lacerations. No drainage, swelling or tenderness. No foreign bodies. No mastoid tenderness. Tympanic membrane is not injected, not scarred, not perforated, not erythematous, not retracted and not bulging.  No middle ear effusion. No hemotympanum.   Significant ceruminous debris obstructing left TM removed under microscopy      Pulmonary/Chest:   Effort normal.     Psychiatric:   She has a normal mood and affect. Her speech is normal and behavior is normal.     Neurological:   She is alert and oriented to person, place, and time. Coordination and gait normal.     Procedure  Cerumen removal performed.  See procedure note.    Data Reviewed  WBC (K/uL)   Date Value   12/15/2021 6.08     No results found for: EOSINOPHIL  No results found for: EOS  Platelets (K/uL)   Date Value   12/15/2021 253     Glucose (mg/dL)   Date Value   12/15/2021 96     No results found for: IGE    Audiogram    I independently reviewed the tracings of the complete audiometric evaluation performed today.  I reviewed the audiogram with the patient as well.  Pertinent findings include essentially a normal study.  Assessment:     1. Normal hearing exam    2. Impacted cerumen of left ear      Plan:     Normal hearing exam        -     Reviewed patient's audiometric testing interpretation which is consistent with normal hearing bilaterally.         -     Hearing conservation strongly recommended when in noisy environments.          -     Patient encouraged to return to clinic every 2 years  for audiometric testing.    Impacted cerumen of left ear  -     Ear Cerumen Removal

## 2022-08-04 NOTE — PROCEDURES
Ear Cerumen Removal    Date/Time: 8/4/2022 2:00 PM  Performed by: Lindsay Munguia NP  Authorized by: Lindsay Munguia NP       Local anesthetic:  None  Location details:  Left ear  Procedure type: curette    Cerumen  Removal Results:  Cerumen completely removed  Patient tolerance:  Patient tolerated the procedure well with no immediate complications     Procedure Note:    The patient was brought to the minor procedure room and placed under the operating microscope of the left ear canal which was cleaned of ceruminous debris. Using a combination of suction, curettes and cup forceps the patient's cerumen impaction was removed. The patient tolerated the procedure well. There were no complications.

## 2022-08-04 NOTE — PROGRESS NOTES
Celi Bermeo was seen today in the clinic for an audiologic evaluation. Dr. Bermeo reported her primary care provider recommended an ear cleaning and evaluation of hearing. Dr. Bermeo denied decreased hearing, tinnitus, otalgia, aural fullness and true vertigo.    Tympanometry revealed Type A in the right ear and Type A in the left ear.     Audiogram results revealed normal hearing sensitivity in the right ear and essentially normal hearing sensitivity in the left ear.      Speech reception thresholds were noted at 15 dBHL in the right ear and 10 dBHL in the left ear.    Speech discrimination scores were 55% in the right ear and 50% in the left ear.    Recommendations:  1. Otologic evaluation  2. Annual audiogram or sooner if change perceived  3. Hearing protection in noise

## 2022-09-13 ENCOUNTER — OFFICE VISIT (OUTPATIENT)
Dept: OPTOMETRY | Facility: CLINIC | Age: 57
End: 2022-09-13
Payer: COMMERCIAL

## 2022-09-13 DIAGNOSIS — H53.9 VISUAL DISTURBANCES: Primary | ICD-10-CM

## 2022-09-13 DIAGNOSIS — H52.4 MYOPIA WITH ASTIGMATISM AND PRESBYOPIA, BILATERAL: ICD-10-CM

## 2022-09-13 DIAGNOSIS — H52.203 MYOPIA WITH ASTIGMATISM AND PRESBYOPIA, BILATERAL: ICD-10-CM

## 2022-09-13 DIAGNOSIS — H52.13 MYOPIA WITH ASTIGMATISM AND PRESBYOPIA, BILATERAL: ICD-10-CM

## 2022-09-13 PROCEDURE — 92015 DETERMINE REFRACTIVE STATE: CPT | Mod: S$GLB,,, | Performed by: OPTOMETRIST

## 2022-09-13 PROCEDURE — 92004 PR EYE EXAM, NEW PATIENT,COMPREHESV: ICD-10-PCS | Mod: S$GLB,,, | Performed by: OPTOMETRIST

## 2022-09-13 PROCEDURE — 1159F MED LIST DOCD IN RCRD: CPT | Mod: CPTII,S$GLB,, | Performed by: OPTOMETRIST

## 2022-09-13 PROCEDURE — 99999 PR PBB SHADOW E&M-EST. PATIENT-LVL II: CPT | Mod: PBBFAC,,, | Performed by: OPTOMETRIST

## 2022-09-13 PROCEDURE — 1159F PR MEDICATION LIST DOCUMENTED IN MEDICAL RECORD: ICD-10-PCS | Mod: CPTII,S$GLB,, | Performed by: OPTOMETRIST

## 2022-09-13 PROCEDURE — 92004 COMPRE OPH EXAM NEW PT 1/>: CPT | Mod: S$GLB,,, | Performed by: OPTOMETRIST

## 2022-09-13 PROCEDURE — 99999 PR PBB SHADOW E&M-EST. PATIENT-LVL II: ICD-10-PCS | Mod: PBBFAC,,, | Performed by: OPTOMETRIST

## 2022-09-13 PROCEDURE — 92015 PR REFRACTION: ICD-10-PCS | Mod: S$GLB,,, | Performed by: OPTOMETRIST

## 2022-09-13 PROCEDURE — 1160F RVW MEDS BY RX/DR IN RCRD: CPT | Mod: CPTII,S$GLB,, | Performed by: OPTOMETRIST

## 2022-09-13 PROCEDURE — 1160F PR REVIEW ALL MEDS BY PRESCRIBER/CLIN PHARMACIST DOCUMENTED: ICD-10-PCS | Mod: CPTII,S$GLB,, | Performed by: OPTOMETRIST

## 2022-09-13 NOTE — PROGRESS NOTES
DAVID CAMEJO: about 3 yrs ago elsewhere  Chief complaint (CC): Patient is here for annual eye exam.  Patient has   more trouble with distance. Patient takes glasses off for near and   computer.   Glasses? + 6 yrs. old  Contacts? -  H/o eye surgery, injections or laser: -  H/o eye injury: -  Known eye conditions? -  Family h/o eye conditions? -  Eye gtts? -      (-) Flashes (-)  Floaters (-) Mucous   (-)  Tearing (-) Itching (-) Burning   (-) Headaches (-) Eye Pain/discomfort (-) Irritation   (-)  Redness (-) Double vision (-) Blurry vision    Diabetic? -  A1c? -      Last edited by Soni Machado on 9/13/2022  1:19 PM.            Assessment /Plan     For exam results, see Encounter Report.    Visual disturbances    Myopia with astigmatism and presbyopia, bilateral      SRx released to patient. Patient educated on lens options. Normal ocular health. RTC 1 year for routine exam.

## 2022-10-06 ENCOUNTER — PATIENT MESSAGE (OUTPATIENT)
Dept: BARIATRICS | Facility: CLINIC | Age: 57
End: 2022-10-06
Payer: COMMERCIAL

## 2022-11-30 ENCOUNTER — OFFICE VISIT (OUTPATIENT)
Dept: INTERNAL MEDICINE | Facility: CLINIC | Age: 57
End: 2022-11-30
Payer: COMMERCIAL

## 2022-11-30 ENCOUNTER — CLINICAL SUPPORT (OUTPATIENT)
Dept: INTERNAL MEDICINE | Facility: CLINIC | Age: 57
End: 2022-11-30
Payer: COMMERCIAL

## 2022-11-30 VITALS
BODY MASS INDEX: 28.96 KG/M2 | DIASTOLIC BLOOD PRESSURE: 76 MMHG | TEMPERATURE: 98 F | HEART RATE: 86 BPM | SYSTOLIC BLOOD PRESSURE: 112 MMHG | HEIGHT: 67 IN | WEIGHT: 184.5 LBS

## 2022-11-30 DIAGNOSIS — Z00.00 PREVENTATIVE HEALTH CARE: Primary | ICD-10-CM

## 2022-11-30 DIAGNOSIS — Z00.00 ROUTINE GENERAL MEDICAL EXAMINATION AT A HEALTH CARE FACILITY: Primary | ICD-10-CM

## 2022-11-30 LAB
ALBUMIN SERPL BCP-MCNC: 3.9 G/DL (ref 3.5–5.2)
ALP SERPL-CCNC: 83 U/L (ref 55–135)
ALT SERPL W/O P-5'-P-CCNC: 12 U/L (ref 10–44)
ANION GAP SERPL CALC-SCNC: 11 MMOL/L (ref 8–16)
AST SERPL-CCNC: 19 U/L (ref 10–40)
BILIRUB SERPL-MCNC: 0.6 MG/DL (ref 0.1–1)
BUN SERPL-MCNC: 15 MG/DL (ref 6–20)
CALCIUM SERPL-MCNC: 9.4 MG/DL (ref 8.7–10.5)
CHLORIDE SERPL-SCNC: 108 MMOL/L (ref 95–110)
CHOLEST SERPL-MCNC: 193 MG/DL (ref 120–199)
CHOLEST/HDLC SERPL: 3.8 {RATIO} (ref 2–5)
CO2 SERPL-SCNC: 26 MMOL/L (ref 23–29)
CREAT SERPL-MCNC: 0.9 MG/DL (ref 0.5–1.4)
ERYTHROCYTE [DISTWIDTH] IN BLOOD BY AUTOMATED COUNT: 13.3 % (ref 11.5–14.5)
EST. GFR  (NO RACE VARIABLE): >60 ML/MIN/1.73 M^2
ESTIMATED AVG GLUCOSE: 103 MG/DL (ref 68–131)
GLUCOSE SERPL-MCNC: 92 MG/DL (ref 70–110)
HBA1C MFR BLD: 5.2 % (ref 4–5.6)
HCT VFR BLD AUTO: 38.1 % (ref 37–48.5)
HDLC SERPL-MCNC: 51 MG/DL (ref 40–75)
HDLC SERPL: 26.4 % (ref 20–50)
HGB BLD-MCNC: 12.5 G/DL (ref 12–16)
LDLC SERPL CALC-MCNC: 128.8 MG/DL (ref 63–159)
MCH RBC QN AUTO: 30.9 PG (ref 27–31)
MCHC RBC AUTO-ENTMCNC: 32.8 G/DL (ref 32–36)
MCV RBC AUTO: 94 FL (ref 82–98)
NONHDLC SERPL-MCNC: 142 MG/DL
PLATELET # BLD AUTO: 251 K/UL (ref 150–450)
PMV BLD AUTO: 11.3 FL (ref 9.2–12.9)
POTASSIUM SERPL-SCNC: 4 MMOL/L (ref 3.5–5.1)
PROT SERPL-MCNC: 7.4 G/DL (ref 6–8.4)
RBC # BLD AUTO: 4.05 M/UL (ref 4–5.4)
SODIUM SERPL-SCNC: 145 MMOL/L (ref 136–145)
TRIGL SERPL-MCNC: 66 MG/DL (ref 30–150)
TSH SERPL DL<=0.005 MIU/L-ACNC: 1.32 UIU/ML (ref 0.4–4)
WBC # BLD AUTO: 6.54 K/UL (ref 3.9–12.7)

## 2022-11-30 PROCEDURE — 90471 ZOSTER RECOMBINANT VACCINE: ICD-10-PCS | Mod: S$GLB,,, | Performed by: INTERNAL MEDICINE

## 2022-11-30 PROCEDURE — 1160F RVW MEDS BY RX/DR IN RCRD: CPT | Mod: CPTII,S$GLB,, | Performed by: INTERNAL MEDICINE

## 2022-11-30 PROCEDURE — 90471 IMMUNIZATION ADMIN: CPT | Mod: S$GLB,,, | Performed by: INTERNAL MEDICINE

## 2022-11-30 PROCEDURE — 3044F PR MOST RECENT HEMOGLOBIN A1C LEVEL <7.0%: ICD-10-PCS | Mod: CPTII,S$GLB,, | Performed by: INTERNAL MEDICINE

## 2022-11-30 PROCEDURE — 1159F MED LIST DOCD IN RCRD: CPT | Mod: CPTII,S$GLB,, | Performed by: INTERNAL MEDICINE

## 2022-11-30 PROCEDURE — 99999 PR PBB SHADOW E&M-EST. PATIENT-LVL III: ICD-10-PCS | Mod: PBBFAC,,, | Performed by: INTERNAL MEDICINE

## 2022-11-30 PROCEDURE — 80053 COMPREHEN METABOLIC PANEL: CPT | Performed by: INTERNAL MEDICINE

## 2022-11-30 PROCEDURE — 99396 PREV VISIT EST AGE 40-64: CPT | Mod: S$GLB,,, | Performed by: INTERNAL MEDICINE

## 2022-11-30 PROCEDURE — 90750 ZOSTER RECOMBINANT VACCINE: ICD-10-PCS | Mod: S$GLB,,, | Performed by: INTERNAL MEDICINE

## 2022-11-30 PROCEDURE — 3078F PR MOST RECENT DIASTOLIC BLOOD PRESSURE < 80 MM HG: ICD-10-PCS | Mod: CPTII,S$GLB,, | Performed by: INTERNAL MEDICINE

## 2022-11-30 PROCEDURE — 90750 HZV VACC RECOMBINANT IM: CPT | Mod: S$GLB,,, | Performed by: INTERNAL MEDICINE

## 2022-11-30 PROCEDURE — 99999 PR PBB SHADOW E&M-EST. PATIENT-LVL III: CPT | Mod: PBBFAC,,, | Performed by: INTERNAL MEDICINE

## 2022-11-30 PROCEDURE — 3078F DIAST BP <80 MM HG: CPT | Mod: CPTII,S$GLB,, | Performed by: INTERNAL MEDICINE

## 2022-11-30 PROCEDURE — 3044F HG A1C LEVEL LT 7.0%: CPT | Mod: CPTII,S$GLB,, | Performed by: INTERNAL MEDICINE

## 2022-11-30 PROCEDURE — 3008F PR BODY MASS INDEX (BMI) DOCUMENTED: ICD-10-PCS | Mod: CPTII,S$GLB,, | Performed by: INTERNAL MEDICINE

## 2022-11-30 PROCEDURE — 3008F BODY MASS INDEX DOCD: CPT | Mod: CPTII,S$GLB,, | Performed by: INTERNAL MEDICINE

## 2022-11-30 PROCEDURE — 99396 PR PREVENTIVE VISIT,EST,40-64: ICD-10-PCS | Mod: S$GLB,,, | Performed by: INTERNAL MEDICINE

## 2022-11-30 PROCEDURE — 1160F PR REVIEW ALL MEDS BY PRESCRIBER/CLIN PHARMACIST DOCUMENTED: ICD-10-PCS | Mod: CPTII,S$GLB,, | Performed by: INTERNAL MEDICINE

## 2022-11-30 PROCEDURE — 80061 LIPID PANEL: CPT | Performed by: INTERNAL MEDICINE

## 2022-11-30 PROCEDURE — 3074F SYST BP LT 130 MM HG: CPT | Mod: CPTII,S$GLB,, | Performed by: INTERNAL MEDICINE

## 2022-11-30 PROCEDURE — 3074F PR MOST RECENT SYSTOLIC BLOOD PRESSURE < 130 MM HG: ICD-10-PCS | Mod: CPTII,S$GLB,, | Performed by: INTERNAL MEDICINE

## 2022-11-30 PROCEDURE — 85027 COMPLETE CBC AUTOMATED: CPT | Performed by: INTERNAL MEDICINE

## 2022-11-30 PROCEDURE — 36415 COLL VENOUS BLD VENIPUNCTURE: CPT | Performed by: INTERNAL MEDICINE

## 2022-11-30 PROCEDURE — 83036 HEMOGLOBIN GLYCOSYLATED A1C: CPT | Performed by: INTERNAL MEDICINE

## 2022-11-30 PROCEDURE — 84443 ASSAY THYROID STIM HORMONE: CPT | Performed by: INTERNAL MEDICINE

## 2022-11-30 PROCEDURE — 1159F PR MEDICATION LIST DOCUMENTED IN MEDICAL RECORD: ICD-10-PCS | Mod: CPTII,S$GLB,, | Performed by: INTERNAL MEDICINE

## 2022-11-30 NOTE — ASSESSMENT & PLAN NOTE
Pt was on Wegovy protocol per Dr. Shasha Villalpando, but the coverage for the program was dropped so she could not afford to stay on it. She lost 10 kg but has regained 3 since stopping the medicine. Now is doing intermittent fasting.

## 2022-11-30 NOTE — PROGRESS NOTES
Subjective:       Patient ID: Celi Bermeo is a 57 y.o. female.    Chief Complaint: [employer] exam for this [facility] pediatric endocrinologist    HPI    BMI 29.0-29.9,adult  Pt was on Wegovy protocol per Dr. Shasha Villalpando, but the coverage for the program was dropped so she could not afford to stay on it. She lost 10 kg but has regained 3 since stopping the medicine. Now is doing intermittent fasting.    She has had three Pfizer Covid vaccines but not the new bivalent omicron booster which I recommended to her. She also is not vaccinated against shingles which was also recommended to her. She has had her seasonal flu vaccine already.    Has occasional oral HSV lesions and uses valtrex prn. I recommend a dose of 2 grams every 12 hrs for two doses, taken at first sign of lesion.    Labs were all reviewed with her and were normal.  Her mammogram is  up to date.  She had a negative cologuard in April 2022.    Review of Systems   Constitutional: Negative for chills, decreased appetite, fever, malaise/fatigue, night sweats, weight gain and weight loss.   HENT:  Negative for congestion, ear pain, hearing loss, hoarse voice, sore throat and tinnitus.    Eyes:  Negative for blurred vision, redness and visual disturbance.   Cardiovascular:  Negative for chest pain, leg swelling and palpitations.   Respiratory:  Negative for cough, hemoptysis, shortness of breath and sputum production.    Hematologic/Lymphatic: Negative for adenopathy. Does not bruise/bleed easily.   Skin:  Negative for dry skin, itching, rash and suspicious lesions.   Musculoskeletal:  Negative for back pain, joint pain, myalgias and neck pain.   Gastrointestinal:  Negative for abdominal pain, constipation, diarrhea, heartburn, nausea and vomiting.   Genitourinary:  Negative for dysuria, flank pain, frequency, hematuria, hesitancy and urgency.   Neurological:  Negative for dizziness, headaches, numbness, paresthesias and weakness.    Psychiatric/Behavioral:  Negative for depression and memory loss. The patient does not have insomnia and is not nervous/anxious.      Objective:      Physical Exam  Vitals and nursing note reviewed.   Constitutional:       General: She is not in acute distress.     Appearance: She is well-developed. She is not diaphoretic.   HENT:      Head: Normocephalic and atraumatic.      Right Ear: Hearing and external ear normal.      Left Ear: Hearing and external ear normal.      Nose: Nose normal.      Mouth/Throat:      Pharynx: Uvula midline. No oropharyngeal exudate.   Eyes:      General: No scleral icterus.        Right eye: No discharge.         Left eye: No discharge.      Conjunctiva/sclera: Conjunctivae normal.      Pupils: Pupils are equal, round, and reactive to light.   Neck:      Thyroid: No thyromegaly.      Trachea: No tracheal deviation.   Cardiovascular:      Rate and Rhythm: Normal rate and regular rhythm.      Heart sounds: Normal heart sounds. No murmur heard.    No friction rub. No gallop.   Pulmonary:      Effort: Pulmonary effort is normal. No respiratory distress.      Breath sounds: Normal breath sounds. No stridor. No wheezing or rales.   Chest:      Chest wall: No tenderness.   Abdominal:      General: Bowel sounds are normal. There is no distension.      Palpations: Abdomen is soft.      Tenderness: There is no abdominal tenderness. There is no guarding or rebound.   Musculoskeletal:         General: No tenderness. Normal range of motion.      Cervical back: Normal range of motion and neck supple.   Lymphadenopathy:      Cervical: No cervical adenopathy.   Skin:     General: Skin is warm and dry.      Coloration: Skin is not pale.      Findings: No erythema or rash.   Neurological:      Mental Status: She is alert and oriented to person, place, and time.      Cranial Nerves: No cranial nerve deficit.      Coordination: Coordination normal.   Psychiatric:         Behavior: Behavior normal.          Thought Content: Thought content normal.         Judgment: Judgment normal.       Assessment:       1. Preventative health care    2. BMI 29.0-29.9,adult        Plan:        See HPI

## 2023-02-22 ENCOUNTER — PATIENT MESSAGE (OUTPATIENT)
Dept: BARIATRICS | Facility: CLINIC | Age: 58
End: 2023-02-22
Payer: COMMERCIAL

## 2023-03-29 RX ORDER — SEMAGLUTIDE 1.34 MG/ML
0.5 INJECTION, SOLUTION SUBCUTANEOUS
Qty: 1.5 ML | Refills: 0 | OUTPATIENT
Start: 2023-03-29 | End: 2023-03-29 | Stop reason: SDUPTHER

## 2023-03-29 RX ORDER — SEMAGLUTIDE 1.34 MG/ML
0.5 INJECTION, SOLUTION SUBCUTANEOUS
Qty: 1.5 ML | Refills: 0 | Status: SHIPPED | OUTPATIENT
Start: 2023-03-29 | End: 2023-04-21

## 2023-04-21 ENCOUNTER — TELEPHONE (OUTPATIENT)
Dept: INTERNAL MEDICINE | Facility: CLINIC | Age: 58
End: 2023-04-21
Payer: COMMERCIAL

## 2023-04-21 DIAGNOSIS — R73.01 IFG (IMPAIRED FASTING GLUCOSE): Primary | ICD-10-CM

## 2023-04-21 DIAGNOSIS — E66.3 OVERWEIGHT (BMI 25.0-29.9): ICD-10-CM

## 2023-04-21 DIAGNOSIS — E88.819 INSULIN RESISTANCE: ICD-10-CM

## 2023-04-21 DIAGNOSIS — R73.01 IFG (IMPAIRED FASTING GLUCOSE): ICD-10-CM

## 2023-04-21 RX ORDER — SEMAGLUTIDE 1 MG/.5ML
1 INJECTION, SOLUTION SUBCUTANEOUS
Qty: 2 ML | Refills: 5 | Status: SHIPPED | OUTPATIENT
Start: 2023-04-21 | End: 2023-04-21

## 2023-04-21 RX ORDER — SEMAGLUTIDE 2.68 MG/ML
2 INJECTION, SOLUTION SUBCUTANEOUS
Qty: 3 ML | Refills: 11 | Status: SHIPPED | OUTPATIENT
Start: 2023-04-21 | End: 2023-05-01

## 2023-04-21 NOTE — TELEPHONE ENCOUNTER
Has been on Ozempic 1 mg weekly. Would like to attempt approval for Wegovy for wt loss indication. Sending, agreed appropriate to continue at the 1 mg dosage since still semaglutide.    ADDENDUM:  Wegovy not covered. Sending Ozempic to compound pharmacy. Agreed on increase to 2 mg since has been tolerating 1 mg well and would like to add potential additional wt loss benefit.

## 2023-04-28 DIAGNOSIS — R73.01 IFG (IMPAIRED FASTING GLUCOSE): ICD-10-CM

## 2023-04-28 NOTE — TELEPHONE ENCOUNTER
Care Due:                  Date            Visit Type   Department     Provider  --------------------------------------------------------------------------------                                             AMIRAH IDALIA                              ADMIT &      HEALTH -                              REVIEW EXEC   INTERNAL  Last Visit: 12-      CHECK        MEDICINE       Ander Moreno  Next Visit: None Scheduled  None         None Found                                                            Last  Test          Frequency    Reason                     Performed    Due Date  --------------------------------------------------------------------------------    Office Visit  12 months..  semaglutide, valACYclovir  12-   12-    HBA1C.......  6 months...  semaglutide..............  11- 05-    [employer] Embedded Care Due Messages. Reference number: [id number].   4/28/2023 12:24:31 PM CDT

## 2023-05-01 DIAGNOSIS — E88.819 INSULIN RESISTANCE: ICD-10-CM

## 2023-05-01 DIAGNOSIS — R73.01 IFG (IMPAIRED FASTING GLUCOSE): Primary | ICD-10-CM

## 2023-05-01 RX ORDER — SEMAGLUTIDE 2.4 MG/.75ML
2.4 INJECTION, SOLUTION SUBCUTANEOUS
Qty: 3 ML | Refills: 11 | Status: SHIPPED | OUTPATIENT
Start: 2023-05-01 | End: 2023-10-10

## 2023-05-01 RX ORDER — SEMAGLUTIDE 1 MG/.5ML
1 INJECTION, SOLUTION SUBCUTANEOUS
Qty: 2 ML | Refills: 5 | OUTPATIENT
Start: 2023-05-01

## 2023-08-21 ENCOUNTER — PATIENT MESSAGE (OUTPATIENT)
Dept: ADMINISTRATIVE | Facility: HOSPITAL | Age: 58
End: 2023-08-21
Payer: COMMERCIAL

## 2023-09-19 DIAGNOSIS — Z00.00 ROUTINE GENERAL MEDICAL EXAMINATION AT A HEALTH CARE FACILITY: Primary | ICD-10-CM

## 2023-10-03 ENCOUNTER — HOSPITAL ENCOUNTER (OUTPATIENT)
Dept: RADIOLOGY | Facility: HOSPITAL | Age: 58
Discharge: HOME | End: 2023-10-03
Attending: INTERNAL MEDICINE
Payer: COMMERCIAL

## 2023-10-03 VITALS — BODY MASS INDEX: 25.55 KG/M2 | WEIGHT: 163.13 LBS

## 2023-10-03 DIAGNOSIS — Z00.00 ROUTINE GENERAL MEDICAL EXAMINATION AT A HEALTH CARE FACILITY: ICD-10-CM

## 2023-10-03 PROCEDURE — 77063 MAMMO DIGITAL SCREENING BILAT WITH TOMO: ICD-10-PCS | Mod: 26,,, | Performed by: RADIOLOGY

## 2023-10-03 PROCEDURE — 77067 SCR MAMMO BI INCL CAD: CPT | Mod: 26,,, | Performed by: RADIOLOGY

## 2023-10-03 PROCEDURE — 77063 BREAST TOMOSYNTHESIS BI: CPT | Mod: 26,,, | Performed by: RADIOLOGY

## 2023-10-03 PROCEDURE — 77067 MAMMO DIGITAL SCREENING BILAT WITH TOMO: ICD-10-PCS | Mod: 26,,, | Performed by: RADIOLOGY

## 2023-10-03 PROCEDURE — 77067 SCR MAMMO BI INCL CAD: CPT | Mod: TC

## 2023-10-10 ENCOUNTER — CLINICAL SUPPORT (OUTPATIENT)
Dept: INTERNAL MEDICINE | Facility: CLINIC | Age: 58
End: 2023-10-10
Payer: COMMERCIAL

## 2023-10-10 ENCOUNTER — OFFICE VISIT (OUTPATIENT)
Dept: INTERNAL MEDICINE | Facility: CLINIC | Age: 58
End: 2023-10-10
Payer: COMMERCIAL

## 2023-10-10 VITALS
WEIGHT: 165.44 LBS | HEIGHT: 68 IN | HEART RATE: 87 BPM | SYSTOLIC BLOOD PRESSURE: 111 MMHG | BODY MASS INDEX: 25.07 KG/M2 | DIASTOLIC BLOOD PRESSURE: 80 MMHG

## 2023-10-10 DIAGNOSIS — R73.01 IFG (IMPAIRED FASTING GLUCOSE): ICD-10-CM

## 2023-10-10 DIAGNOSIS — M25.512 CHRONIC LEFT SHOULDER PAIN: ICD-10-CM

## 2023-10-10 DIAGNOSIS — Z23 NEED FOR SHINGLES VACCINE: ICD-10-CM

## 2023-10-10 DIAGNOSIS — Z00.00 ANNUAL PHYSICAL EXAM: ICD-10-CM

## 2023-10-10 DIAGNOSIS — Z00.00 ROUTINE GENERAL MEDICAL EXAMINATION AT A HEALTH CARE FACILITY: Primary | ICD-10-CM

## 2023-10-10 DIAGNOSIS — G89.29 CHRONIC LEFT SHOULDER PAIN: ICD-10-CM

## 2023-10-10 DIAGNOSIS — E88.819 INSULIN RESISTANCE: ICD-10-CM

## 2023-10-10 DIAGNOSIS — E78.5 HYPERLIPIDEMIA, UNSPECIFIED HYPERLIPIDEMIA TYPE: ICD-10-CM

## 2023-10-10 DIAGNOSIS — Z00.00 ENCOUNTER FOR ANNUAL HEALTH EXAMINATION: Primary | ICD-10-CM

## 2023-10-10 LAB
ALBUMIN SERPL BCP-MCNC: 4.1 G/DL (ref 3.5–5.2)
ALP SERPL-CCNC: 72 U/L (ref 55–135)
ALT SERPL W/O P-5'-P-CCNC: 10 U/L (ref 10–44)
ANION GAP SERPL CALC-SCNC: 11 MMOL/L (ref 8–16)
AST SERPL-CCNC: 18 U/L (ref 10–40)
BASOPHILS # BLD AUTO: 0.03 K/UL (ref 0–0.2)
BASOPHILS NFR BLD: 0.5 % (ref 0–1.9)
BILIRUB SERPL-MCNC: 0.6 MG/DL (ref 0.1–1)
BUN SERPL-MCNC: 17 MG/DL (ref 6–20)
CALCIUM SERPL-MCNC: 9.8 MG/DL (ref 8.7–10.5)
CHLORIDE SERPL-SCNC: 108 MMOL/L (ref 95–110)
CHOLEST SERPL-MCNC: 248 MG/DL (ref 120–199)
CHOLEST/HDLC SERPL: 3.9 {RATIO} (ref 2–5)
CO2 SERPL-SCNC: 24 MMOL/L (ref 23–29)
CREAT SERPL-MCNC: 0.9 MG/DL (ref 0.5–1.4)
DIFFERENTIAL METHOD: NORMAL
EOSINOPHIL # BLD AUTO: 0.1 K/UL (ref 0–0.5)
EOSINOPHIL NFR BLD: 1 % (ref 0–8)
ERYTHROCYTE [DISTWIDTH] IN BLOOD BY AUTOMATED COUNT: 13.2 % (ref 11.5–14.5)
EST. GFR  (NO RACE VARIABLE): >60 ML/MIN/1.73 M^2
ESTIMATED AVG GLUCOSE: 97 MG/DL (ref 68–131)
GLUCOSE SERPL-MCNC: 86 MG/DL (ref 70–110)
HBA1C MFR BLD: 5 % (ref 4–5.6)
HCT VFR BLD AUTO: 38.2 % (ref 37–48.5)
HDLC SERPL-MCNC: 64 MG/DL (ref 40–75)
HDLC SERPL: 25.8 % (ref 20–50)
HGB BLD-MCNC: 12.6 G/DL (ref 12–16)
IMM GRANULOCYTES # BLD AUTO: 0.01 K/UL (ref 0–0.04)
IMM GRANULOCYTES NFR BLD AUTO: 0.2 % (ref 0–0.5)
LDLC SERPL CALC-MCNC: 169.2 MG/DL (ref 63–159)
LYMPHOCYTES # BLD AUTO: 2.3 K/UL (ref 1–4.8)
LYMPHOCYTES NFR BLD: 38.4 % (ref 18–48)
MCH RBC QN AUTO: 29.9 PG (ref 27–31)
MCHC RBC AUTO-ENTMCNC: 33 G/DL (ref 32–36)
MCV RBC AUTO: 91 FL (ref 82–98)
MONOCYTES # BLD AUTO: 0.4 K/UL (ref 0.3–1)
MONOCYTES NFR BLD: 6.7 % (ref 4–15)
NEUTROPHILS # BLD AUTO: 3.2 K/UL (ref 1.8–7.7)
NEUTROPHILS NFR BLD: 53.2 % (ref 38–73)
NONHDLC SERPL-MCNC: 184 MG/DL
NRBC BLD-RTO: 0 /100 WBC
PLATELET # BLD AUTO: 274 K/UL (ref 150–450)
PMV BLD AUTO: 10.3 FL (ref 9.2–12.9)
POTASSIUM SERPL-SCNC: 4.4 MMOL/L (ref 3.5–5.1)
PROT SERPL-MCNC: 7.7 G/DL (ref 6–8.4)
RBC # BLD AUTO: 4.21 M/UL (ref 4–5.4)
SODIUM SERPL-SCNC: 143 MMOL/L (ref 136–145)
TRIGL SERPL-MCNC: 74 MG/DL (ref 30–150)
TSH SERPL DL<=0.005 MIU/L-ACNC: 1.15 UIU/ML (ref 0.4–4)
WBC # BLD AUTO: 5.99 K/UL (ref 3.9–12.7)

## 2023-10-10 PROCEDURE — 1159F MED LIST DOCD IN RCRD: CPT | Mod: CPTII,S$GLB,, | Performed by: INTERNAL MEDICINE

## 2023-10-10 PROCEDURE — 99999 PR PBB SHADOW E&M-EST. PATIENT-LVL III: ICD-10-PCS | Mod: PBBFAC,,, | Performed by: INTERNAL MEDICINE

## 2023-10-10 PROCEDURE — 99396 PR PREVENTIVE VISIT,EST,40-64: ICD-10-PCS | Mod: S$GLB,,, | Performed by: INTERNAL MEDICINE

## 2023-10-10 PROCEDURE — 3074F SYST BP LT 130 MM HG: CPT | Mod: CPTII,S$GLB,, | Performed by: INTERNAL MEDICINE

## 2023-10-10 PROCEDURE — 36415 COLL VENOUS BLD VENIPUNCTURE: CPT | Performed by: INTERNAL MEDICINE

## 2023-10-10 PROCEDURE — 85025 COMPLETE CBC W/AUTO DIFF WBC: CPT | Performed by: INTERNAL MEDICINE

## 2023-10-10 PROCEDURE — 80053 COMPREHEN METABOLIC PANEL: CPT | Performed by: INTERNAL MEDICINE

## 2023-10-10 PROCEDURE — 80061 LIPID PANEL: CPT | Performed by: INTERNAL MEDICINE

## 2023-10-10 PROCEDURE — 90750 ZOSTER RECOMBINANT VACCINE: ICD-10-PCS | Mod: S$GLB,,, | Performed by: INTERNAL MEDICINE

## 2023-10-10 PROCEDURE — 1159F PR MEDICATION LIST DOCUMENTED IN MEDICAL RECORD: ICD-10-PCS | Mod: CPTII,S$GLB,, | Performed by: INTERNAL MEDICINE

## 2023-10-10 PROCEDURE — 90750 HZV VACC RECOMBINANT IM: CPT | Mod: S$GLB,,, | Performed by: INTERNAL MEDICINE

## 2023-10-10 PROCEDURE — 83036 HEMOGLOBIN GLYCOSYLATED A1C: CPT | Performed by: INTERNAL MEDICINE

## 2023-10-10 PROCEDURE — 99396 PREV VISIT EST AGE 40-64: CPT | Mod: S$GLB,,, | Performed by: INTERNAL MEDICINE

## 2023-10-10 PROCEDURE — 83695 ASSAY OF LIPOPROTEIN(A): CPT | Performed by: INTERNAL MEDICINE

## 2023-10-10 PROCEDURE — 90471 ZOSTER RECOMBINANT VACCINE: ICD-10-PCS | Mod: S$GLB,,, | Performed by: INTERNAL MEDICINE

## 2023-10-10 PROCEDURE — 84443 ASSAY THYROID STIM HORMONE: CPT | Performed by: INTERNAL MEDICINE

## 2023-10-10 PROCEDURE — 3079F PR MOST RECENT DIASTOLIC BLOOD PRESSURE 80-89 MM HG: ICD-10-PCS | Mod: CPTII,S$GLB,, | Performed by: INTERNAL MEDICINE

## 2023-10-10 PROCEDURE — 3074F PR MOST RECENT SYSTOLIC BLOOD PRESSURE < 130 MM HG: ICD-10-PCS | Mod: CPTII,S$GLB,, | Performed by: INTERNAL MEDICINE

## 2023-10-10 PROCEDURE — 90471 IMMUNIZATION ADMIN: CPT | Mod: S$GLB,,, | Performed by: INTERNAL MEDICINE

## 2023-10-10 PROCEDURE — 3008F BODY MASS INDEX DOCD: CPT | Mod: CPTII,S$GLB,, | Performed by: INTERNAL MEDICINE

## 2023-10-10 PROCEDURE — 3079F DIAST BP 80-89 MM HG: CPT | Mod: CPTII,S$GLB,, | Performed by: INTERNAL MEDICINE

## 2023-10-10 PROCEDURE — 99999 PR PBB SHADOW E&M-EST. PATIENT-LVL III: CPT | Mod: PBBFAC,,, | Performed by: INTERNAL MEDICINE

## 2023-10-10 PROCEDURE — 3008F PR BODY MASS INDEX (BMI) DOCUMENTED: ICD-10-PCS | Mod: CPTII,S$GLB,, | Performed by: INTERNAL MEDICINE

## 2023-10-10 RX ORDER — SEMAGLUTIDE 2.4 MG/.75ML
2.4 INJECTION, SOLUTION SUBCUTANEOUS
Qty: 3 ML | Refills: 15 | Status: SHIPPED | OUTPATIENT
Start: 2023-10-10 | End: 2024-03-20

## 2023-10-10 NOTE — PROGRESS NOTES
Subjective:       Patient ID: Celi Bermeo is a 58 y.o. female.    Chief Complaint: [employer]      HPI  Annual health exam. Reviewed medical, surgical, social and family history, medications, appropriate preventive health screenings, as well as vaccination history. Updates as noted below or in assessment and plan.    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  Lost significant amount of wt with Wegovy over past couple of yrs. Stopped around 3 to 4 mths ago. Lost 20 lbs this past yr. Watching diet. Exercising. Notes some left shoulder pains past few weeks.    Review of Systems   All other systems reviewed and are negative.      Past Medical History:   Diagnosis Date    History of cold sores     Hyperlipidemia     IFG (impaired fasting glucose)     Increased body mass index (BMI)          Current Outpatient Medications:     valACYclovir (VALTREX) 1000 MG tablet, Take 1 tablet (1,000 mg total) by mouth every 8 (eight) hours as needed (For cold sores. Take for 7 days.)., Disp: 21 tablet, Rfl: 5    Past Surgical History:   Procedure Laterality Date    APPENDECTOMY       SECTION         Family History   Problem Relation Age of Onset    Ulcers Mother         Duodenal ulcer    Diabetes type II Father     Colon cancer Neg Hx        Social History     Tobacco Use    Smoking status: Never    Smokeless tobacco: Never   Substance Use Topics    Alcohol use: Yes     Comment: socially    Drug use: Never       Immunization History   Administered Date(s) Administered    COVID-19, MRNA, LN-S, PF (Pfizer) (Purple Cap) 2020, 2021, 2022    Hepatitis B (recombinant) Adjuvanted, 2 dose 2019, 2019    Influenza (FLUAD) - Quadrivalent - Adjuvanted - PF *Preferred* (65+) 2021, 10/17/2022    Influenza - Quadrivalent - PF *Preferred* (6 months and older) 2019, 2020    Tdap 07/15/2022    Zoster Recombinant 2022, 10/10/2023         Objective:       Vitals:    10/10/23 0923   BP: 111/80   Pulse: 87       Physical Exam  Constitutional:       General: She is not in acute distress.     Appearance: Normal appearance. She is well-developed. She is not ill-appearing.   HENT:      Head: Normocephalic and atraumatic.      Right Ear: Hearing and tympanic membrane normal. There is no impacted cerumen.      Left Ear: Hearing and tympanic membrane normal. There is no impacted cerumen.      Nose: Nose normal.      Mouth/Throat:      Mouth: Mucous membranes are moist.      Pharynx: Oropharynx is clear.   Eyes:      Extraocular Movements: Extraocular movements intact.      Conjunctiva/sclera: Conjunctivae normal.      Pupils: Pupils are equal, round, and reactive to light.   Neck:      Vascular: No carotid bruit.   Cardiovascular:      Rate and Rhythm: Normal rate and regular rhythm.      Heart sounds: Normal heart sounds. No murmur heard.  Pulmonary:      Effort: Pulmonary effort is normal. No respiratory distress.      Breath sounds: Normal breath sounds. No wheezing, rhonchi or rales.   Abdominal:      General: Abdomen is flat. There is no distension.      Palpations: Abdomen is soft. There is no mass.      Tenderness: There is no abdominal tenderness.      Hernia: No hernia is present.   Musculoskeletal:         General: Tenderness (Left posterior shoulder and deltoid with palpation, abduction and external rotation.) present. No swelling, deformity or signs of injury. Normal range of motion.      Cervical back: Normal range of motion. No rigidity or tenderness.      Right lower leg: No edema.      Left lower leg: No edema.   Lymphadenopathy:      Cervical: No cervical adenopathy.   Skin:     General: Skin is warm and dry.      Findings: No lesion or rash.   Neurological:      General: No focal deficit present.      Mental Status: She is alert and oriented to person, place, and time.      Cranial Nerves: No cranial nerve deficit.      Coordination: Coordination normal.       Gait: Gait normal.      Deep Tendon Reflexes: Reflexes normal.   Psychiatric:         Mood and Affect: Mood normal.         Behavior: Behavior normal.         Thought Content: Thought content normal.         Judgment: Judgment normal.              Assessment/Plan:     1) Annual wellness exam  2) Hyperlipidemia - Repeat lipids today. Pt requesting lp(a) check as well. Continue diet and exercise focus.  3) Left shoulder pains - 2 to 3 weeks, likely minor injury at posterior deltoid/rotator cuff. Agreed on PT referral. Try Aleve twice daily prn.  4) Increased BMI - Much improved over past 2 yrs. Has stopped Wegovy as of a few months ago. Exercising and dieting.    - 2nd Shingrix today.  - Cologuard negative 2022. Plan for repeat colon screening after 3 yrs.  - Recent screening mammogram negative. Continue annual screening.  - PAP negative 2022. Follows with GYN.  - Blood pressure normal.

## 2023-10-12 ENCOUNTER — PATIENT MESSAGE (OUTPATIENT)
Dept: INTERNAL MEDICINE | Facility: CLINIC | Age: 58
End: 2023-10-12
Payer: COMMERCIAL

## 2023-10-12 LAB — LPA SERPL-MCNC: <5 MG/DL (ref 0–30)

## 2023-10-20 ENCOUNTER — HOSPITAL ENCOUNTER (OUTPATIENT)
Dept: RADIOLOGY | Facility: HOSPITAL | Age: 58
Discharge: HOME | End: 2023-10-20
Attending: INTERNAL MEDICINE
Payer: COMMERCIAL

## 2023-10-20 DIAGNOSIS — G89.29 CHRONIC LEFT SHOULDER PAIN: ICD-10-CM

## 2023-10-20 DIAGNOSIS — M25.512 CHRONIC LEFT SHOULDER PAIN: ICD-10-CM

## 2023-10-20 PROCEDURE — 73030 X-RAY EXAM OF SHOULDER: CPT | Mod: 26,LT,, | Performed by: RADIOLOGY

## 2023-10-20 PROCEDURE — 73030 XR SHOULDER COMPLETE 2 OR MORE VIEWS LEFT: ICD-10-PCS | Mod: 26,LT,, | Performed by: RADIOLOGY

## 2023-10-20 PROCEDURE — 73030 X-RAY EXAM OF SHOULDER: CPT | Mod: TC,LT

## 2023-11-16 ENCOUNTER — CLINICAL SUPPORT (OUTPATIENT)
Dept: REHABILITATION | Facility: HOSPITAL | Age: 58
End: 2023-11-16
Payer: COMMERCIAL

## 2023-11-16 DIAGNOSIS — M25.512 CHRONIC LEFT SHOULDER PAIN: ICD-10-CM

## 2023-11-16 DIAGNOSIS — R29.898 SHOULDER WEAKNESS: Primary | ICD-10-CM

## 2023-11-16 DIAGNOSIS — G89.29 CHRONIC LEFT SHOULDER PAIN: ICD-10-CM

## 2023-11-16 PROCEDURE — 97110 THERAPEUTIC EXERCISES: CPT

## 2023-11-16 PROCEDURE — 97161 PT EVAL LOW COMPLEX 20 MIN: CPT

## 2023-11-16 NOTE — PLAN OF CARE
"[facility] [place] [facility]   Physical Therapy Initial Evaluation      Name: Celi Bermeo  Clinic Number: [id number]    Therapy Diagnosis:   Encounter Diagnoses   Name Primary?    Chronic left shoulder pain     Shoulder weakness Yes        Physician: Ander Moreno MD    Physician Orders: PT Eval and Treat   Medical Diagnosis from Referral: M25.512,G89.29 (ICD-10-CM) - Chronic left shoulder pain   Evaluation Date: 11/16/2023  Authorization Period Expiration: 10/19/24  Plan of Care Expiration: 2/16/24  Progress Note Due: 12/16/23  Visit # / Visits authorized: 1/ 1   FOTO: 1/ 3    Precautions: Standard     Time In: 2:30pm  Time Out: 3:15pm  Total Appointment Time (timed & untimed codes): 45 minutes    Subjective     Date of onset: 6 weeks ago     History of current condition - Celi reports: 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 She saw two Orthopedic physicians and was told possible RTC or deltoid tear. She did not taken anything for pain for 2 weeks and then started taking ibuprofen "around the clock". She cannot sleep on the L side and the pain is worst at night. She has difficulty with UB dressing due to pain. She is R hand dominant.     Falls: none     Imaging: xrays: FINDINGS:  Bones: No fracture.  No lytic or blastic lesion.     Joints: No evidence for glenohumeral dislocation.  Acromioclavicular joint is unremarkable.     Soft tissues: Unremarkable.    Prior Therapy: none   Social History:  lives with their mother a primary caregiver   Occupation: pediatric physician with [employer]   Prior Level of Function: (I)  Current Level of Function: (I)    Pain:  Current 2/10, worst 9/10, best 2/10   Location: left shoulder    Description: Burning and Sharp  Aggravating Factors: Night Time, Lifting, and reaching behind back   Easing Factors: ice and rest    Patients goals: reduce shoulder pain in order to perform dressing tasks, household chores & work duties      Medical " History:   Past Medical History:   Diagnosis Date    History of cold sores     Hyperlipidemia     IFG (impaired fasting glucose) 2019    Increased body mass index (BMI)        Surgical History:   Celi Bermeo  has a past surgical history that includes  section () and Appendectomy ().    Medications:   Celi has a current medication list which includes the following prescription(s): wegovy and valacyclovir.    Allergies:   Review of patient's allergies indicates:  No Known Allergies     Objective        POSTURE    Postural examination/scapula alignment: Rounded shoulder          NEUROLOGICAL SCREENING     Sensory deficit: WFL BUE     Palpation: TTP L biceps tendon/groove       Range of Motion/Strength:       Shoulder Right MMT Left MMT Pain/Dysfunction with Movement (pain=!)   AROM        flexion  150 deg 4/5  125 deg ! 3+/5 !            abduction  150 deg 4/5  130 deg  3+/5 !    Internal rotation  Able to reach T8 behind back  4/5  Able to reach L1 behind back  3+/5    ER at 0° abd  Able to reach T2 behind head 4/5  Able to reach C5 behind head  3+/5 !    rhomboids  3+/5  3+/5    Mid trap  3+/5  3+/5    Lower trap   3+/5  3+/5      Elbow Right MMT Left MMT Pain/Dysfunction with Movement (pain=!)   AROM        flexion  WFL 4/5  WFL 4-/5    extension  WFL 4/5  WFL 4-/5      Special Tests:   Neg Cason Deyvi  Neg Neer  + Speeds  + Empty/Full Can  +painful Arc  +yergasons's    Intake Outcome Measure for FOTO Shoulder Survey    Therapist reviewed FOTO scores for Celi Bermeo on 2023.   FOTO documents entered into [employer] - see Media section or FOTO account episode details.     Intake Score: 49%         Treatment     Total Treatment time (time-based codes) separate from Evaluation: 10 minutes     Celi received the treatments listed below:      therapeutic exercises to develop strength, endurance, ROM, and flexibility for 10 minutes including:  HEP instruction & return demonstration:    [] supine shoulder flexion AAROM  [] supine shoulder Abd AAROM  [] scap retractions  []  []  []    Next  []pulleys  []joint mobs (posterior/inf), passive ROM  []shoulder isometrics  []    []    Patient Education and Home Exercises     Education provided:   - HEP  -use of home TENS    Written Home Exercises Provided: yes. Exercises were reviewed and Celi was able to demonstrate them prior to the end of the session.  Celi demonstrated good  understanding of the education provided. See EMR under Patient Instructions for exercises provided during therapy sessions.    Assessment     Celi is a 58 y.o. female referred to outpatient Physical Therapy with a medical diagnosis of chronic L shoulder pain. Patient presents with signs/symptoms consistent with rotator cuff and/or biceps pathology, with painful shoulder range of motion and weakness affecting her functional mobility. Pt has a limited work schedule and is therefore only able to attend PT 1x/week.     Patient prognosis is Good.   Patient will benefit from skilled outpatient Physical Therapy to address the deficits stated above and in the chart below, provide patient /family education, and to maximize patientt's level of independence.     Plan of care discussed with patient: Yes  Patient's spiritual, cultural and educational needs considered and patient is agreeable to the plan of care and goals as stated below:     Anticipated Barriers for therapy: none  Medical Necessity is demonstrated by the following  History  Co-morbidities and personal factors that may impact the plan of care [x] LOW: no personal factors / co-morbidities  [] MODERATE: 1-2 personal factors / co-morbidities  [] HIGH: 3+ personal factors / co-morbidities    Moderate / High Support Documentation:   Co-morbidities affecting plan of care: none    Personal Factors:   lifestyle     Examination  Body Structures and Functions, activity limitations and participation restrictions that may impact the  plan of care [] LOW: addressing 1-2 elements  [x] MODERATE: 3+ elements  [] HIGH: 4+ elements (please support below)    Moderate / High Support Documentation: impacts sleep, ability to perform work duties, household chores and care of mother      Clinical Presentation [] LOW: stable  [x] MODERATE: Evolving  [] HIGH: Unstable     Decision Making/ Complexity Score: low       Goals:  Short Term Goals: 6 visits  1. Pt will be (I) /c HEP to supplement PT in order to improve functional tasks  2. Pt will improve B manuel-scaps MMTs to 4-/5 grossly to improve strength for functional activities  3. Pt will achieve L shoulder flexion >/= 140deg with pain <3/10      Long Term Goals: 12 visits   1. Pt will improve B manuel-scaps  MMTs to 4/5 grossly to improve strength for functional activities  2. Pt will improve FOTO score to </= 60% to reduce perceived pain with mobility   4. Pt will achieve L shoulder internal rotation to T10 in order to perform UB dressing   Plan     Plan of care Certification: 11/16/2023 to 2/16/24.    Outpatient Physical Therapy 1-2 times weekly for 12 visits to include the following interventions: Manual Therapy, Moist Heat/ Ice, Neuromuscular Re-ed, Patient Education, Therapeutic Activities, and Therapeutic Exercise, E-stim and Dry Needling as needed.     Sandrine Lane, PT

## 2023-11-17 ENCOUNTER — TELEPHONE (OUTPATIENT)
Dept: INTERNAL MEDICINE | Facility: CLINIC | Age: 58
End: 2023-11-17
Payer: COMMERCIAL

## 2023-11-17 DIAGNOSIS — M25.512 CHRONIC LEFT SHOULDER PAIN: ICD-10-CM

## 2023-11-17 DIAGNOSIS — G89.29 CHRONIC LEFT SHOULDER PAIN: ICD-10-CM

## 2023-11-17 DIAGNOSIS — M25.512 ACUTE PAIN OF LEFT SHOULDER: Primary | ICD-10-CM

## 2023-11-17 NOTE — PROGRESS NOTES
"[facility]   Physical Therapy Treatment Note      Name: Celi Bermeo  Clinic Number: [id number]    Therapy Diagnosis:   Encounter Diagnoses   Name Primary?    Shoulder weakness Yes    Chronic left shoulder pain      Physician: Ander Moreno MD    Visit Date: 11/20/2023    Physician Orders: PT Eval and Treat   Medical Diagnosis from Referral: M25.512,G89.29 (ICD-10-CM) - Chronic left shoulder pain   Evaluation Date: 11/16/2023  Authorization Period Expiration: 10/19/24  Plan of Care Expiration: 2/16/24  Progress Note Due: 12/16/23  Visit # / Visits authorized: 1/ 1, 1/20  FOTO: 1/ 3     Precautions: Standard      PTA Visit #: 1/5     Time In: 12:15 pm   Time Out: 1:05 pm   Total Billable Time: 50 minutes    Subjective     Pt reports: that she is still having constant pain in her shoulder but it gets worse when she moves it a certain direction.  She was compliant with home exercise program.  Response to previous treatment: last session was initial eval  Functional change: none at this time     Pain: 2/10  Location: left shoulder      Objective      Objective Measures updated at progress report unless specified.     Treatment     Celi received the treatments listed below:      therapeutic exercises to develop strength, endurance, ROM, and flexibility for 33 minutes including:  [x]pulleys (flex/scap) 3 min ea 5" hold   [x] supine shoulder flexion AAROM 2x10 (c/ e-stim)   [x] supine shoulder Abd AAROM 2x10  (c/ e-stim) LUE  [x] Supine wand ER 2x10 (c/ e-stim) LUE  [x]  scap retractions 2x10 3-5"   []   []          manual therapy techniques:  were applied to the: left shoulder for 9 minutes, including:  [x]joint mobs (posterior/inf), passive ROM      neuromuscular re-education activities to improve: Proprioception, Posture, and Motor Control for 8 minutes. The following activities were included:  [x]shoulder isometrics (flex/abd/IR/ER/ext) 5" 10x ea LUE       supervised modalities " after being cleared for contradictions: IFC Electrical Stimulation:  Celi received IFC Electrical Stimulation for pain control applied to the left shoulder. Pt received for 15 minutes during therex. Celi tolderated treatment well without any adverse effects.      therapeutic activities to improve functional performance for 0  minutes, including:      Patient Education and Home Exercises       Education provided:   - HEP    Written Home Exercises Provided: Patient instructed to cont prior HEP. Exercises were reviewed and Celi was able to demonstrate them prior to the end of the session.  Celi demonstrated good  understanding of the education provided. See EMR under Patient Instructions for exercises provided during therapy sessions    Assessment     Initiated therapy program following pt's initial evaluation with no adverse effects. Pt noted discomfort in shoulder abduction PROM, however performed mobilizations in order to decrease painful movement. IFC electrical stimulation performed during mat based exercises in order to decrease pain with ROM. Pt also instructed in use of TENS unit at home as she informed me at beginning of session that she had purchased one. Will monitor pt's response to therapy sessions and progress per her tolerance pending updates from her next doctors appointment.     Celi Is progressing well towards her goals.   Pt prognosis is Good.     Pt will continue to benefit from skilled outpatient physical therapy to address the deficits listed in the problem list box on initial evaluation, provide pt/family education and to maximize pt's level of independence in the home and community environment.     Pt's spiritual, cultural and educational needs considered and pt agreeable to plan of care and goals.     Anticipated barriers to physical therapy: none    Goals:   Short Term Goals: 6 visits  1. Pt will be (I) /c HEP to supplement PT in order to improve functional tasks  2. Pt will improve  B manuel-scaps MMTs to 4-/5 grossly to improve strength for functional activities  3. Pt will achieve L shoulder flexion >/= 140deg with pain <3/10        Long Term Goals: 12 visits   1. Pt will improve B manuel-scaps  MMTs to 4/5 grossly to improve strength for functional activities  2. Pt will improve FOTO score to </= 60% to reduce perceived pain with mobility   4. Pt will achieve L shoulder internal rotation to T10 in order to perform UB dressing     Plan     Plan of care Certification: 11/16/2023 to 2/16/24.    Britney Benavides, PTA

## 2023-11-17 NOTE — TELEPHONE ENCOUNTER
Left shoulder pains persistent greater than 6 weeks now and having difficulty working with PT. Agreed on MRI and Orthopedics referral.

## 2023-11-20 ENCOUNTER — CLINICAL SUPPORT (OUTPATIENT)
Dept: REHABILITATION | Facility: HOSPITAL | Age: 58
End: 2023-11-20
Payer: COMMERCIAL

## 2023-11-20 DIAGNOSIS — G89.29 CHRONIC LEFT SHOULDER PAIN: ICD-10-CM

## 2023-11-20 DIAGNOSIS — R29.898 SHOULDER WEAKNESS: Primary | ICD-10-CM

## 2023-11-20 DIAGNOSIS — M25.512 CHRONIC LEFT SHOULDER PAIN: ICD-10-CM

## 2023-11-20 PROCEDURE — 97110 THERAPEUTIC EXERCISES: CPT | Mod: CQ

## 2023-11-20 PROCEDURE — 97112 NEUROMUSCULAR REEDUCATION: CPT | Mod: CQ

## 2023-11-20 PROCEDURE — 97140 MANUAL THERAPY 1/> REGIONS: CPT | Mod: CQ

## 2023-11-20 PROCEDURE — 97014 ELECTRIC STIMULATION THERAPY: CPT | Mod: CQ

## 2023-11-22 ENCOUNTER — PATIENT MESSAGE (OUTPATIENT)
Dept: ORTHOPEDICS | Facility: CLINIC | Age: 58
End: 2023-11-22

## 2023-11-30 ENCOUNTER — HOSPITAL ENCOUNTER (OUTPATIENT)
Dept: RADIOLOGY | Facility: HOSPITAL | Age: 58
Discharge: HOME | End: 2023-11-30
Attending: INTERNAL MEDICINE
Payer: COMMERCIAL

## 2023-11-30 DIAGNOSIS — G89.29 CHRONIC LEFT SHOULDER PAIN: ICD-10-CM

## 2023-11-30 DIAGNOSIS — M25.512 CHRONIC LEFT SHOULDER PAIN: ICD-10-CM

## 2023-11-30 PROCEDURE — 73221 MRI JOINT UPR EXTREM W/O DYE: CPT | Mod: 26,LT,GC, | Performed by: INTERNAL MEDICINE

## 2023-11-30 PROCEDURE — 73221 MRI SHOULDER WITHOUT CONTRAST LEFT: ICD-10-PCS | Mod: 26,LT,GC, | Performed by: INTERNAL MEDICINE

## 2023-11-30 PROCEDURE — 73221 MRI JOINT UPR EXTREM W/O DYE: CPT | Mod: TC,LT

## 2023-12-01 ENCOUNTER — OFFICE VISIT (OUTPATIENT)
Dept: ORTHOPEDICS | Facility: CLINIC | Age: 58
End: 2023-12-01
Payer: COMMERCIAL

## 2023-12-01 DIAGNOSIS — G89.29 CHRONIC LEFT SHOULDER PAIN: Primary | ICD-10-CM

## 2023-12-01 DIAGNOSIS — M25.512 CHRONIC LEFT SHOULDER PAIN: Primary | ICD-10-CM

## 2023-12-01 PROCEDURE — 3044F PR MOST RECENT HEMOGLOBIN A1C LEVEL <7.0%: ICD-10-PCS | Mod: CPTII,S$GLB,, | Performed by: PHYSICIAN ASSISTANT

## 2023-12-01 PROCEDURE — 1159F MED LIST DOCD IN RCRD: CPT | Mod: CPTII,S$GLB,, | Performed by: PHYSICIAN ASSISTANT

## 2023-12-01 PROCEDURE — 1160F RVW MEDS BY RX/DR IN RCRD: CPT | Mod: CPTII,S$GLB,, | Performed by: PHYSICIAN ASSISTANT

## 2023-12-01 PROCEDURE — 1160F PR REVIEW ALL MEDS BY PRESCRIBER/CLIN PHARMACIST DOCUMENTED: ICD-10-PCS | Mod: CPTII,S$GLB,, | Performed by: PHYSICIAN ASSISTANT

## 2023-12-01 PROCEDURE — 20610 DRAIN/INJ JOINT/BURSA W/O US: CPT | Mod: LT,S$GLB,, | Performed by: PHYSICIAN ASSISTANT

## 2023-12-01 PROCEDURE — 20610 PR DRAIN/INJECT LARGE JOINT/BURSA: ICD-10-PCS | Mod: LT,S$GLB,, | Performed by: PHYSICIAN ASSISTANT

## 2023-12-01 PROCEDURE — 99999 PR PBB SHADOW E&M-EST. PATIENT-LVL II: ICD-10-PCS | Mod: PBBFAC,,, | Performed by: PHYSICIAN ASSISTANT

## 2023-12-01 PROCEDURE — 1159F PR MEDICATION LIST DOCUMENTED IN MEDICAL RECORD: ICD-10-PCS | Mod: CPTII,S$GLB,, | Performed by: PHYSICIAN ASSISTANT

## 2023-12-01 PROCEDURE — 99999 PR PBB SHADOW E&M-EST. PATIENT-LVL II: CPT | Mod: PBBFAC,,, | Performed by: PHYSICIAN ASSISTANT

## 2023-12-01 PROCEDURE — 3044F HG A1C LEVEL LT 7.0%: CPT | Mod: CPTII,S$GLB,, | Performed by: PHYSICIAN ASSISTANT

## 2023-12-01 PROCEDURE — 99214 PR OFFICE/OUTPT VISIT, EST, LEVL IV, 30-39 MIN: ICD-10-PCS | Mod: 25,S$GLB,, | Performed by: PHYSICIAN ASSISTANT

## 2023-12-01 PROCEDURE — 99214 OFFICE O/P EST MOD 30 MIN: CPT | Mod: 25,S$GLB,, | Performed by: PHYSICIAN ASSISTANT

## 2023-12-01 RX ORDER — BETAMETHASONE SODIUM PHOSPHATE AND BETAMETHASONE ACETATE 3; 3 MG/ML; MG/ML
6 INJECTION, SUSPENSION INTRA-ARTICULAR; INTRALESIONAL; INTRAMUSCULAR; SOFT TISSUE
Status: COMPLETED | OUTPATIENT
Start: 2023-12-01 | End: 2023-12-01

## 2023-12-01 RX ADMIN — BETAMETHASONE SODIUM PHOSPHATE AND BETAMETHASONE ACETATE 6 MG: 3; 3 INJECTION, SUSPENSION INTRA-ARTICULAR; INTRALESIONAL; INTRAMUSCULAR; SOFT TISSUE at 01:12

## 2023-12-01 NOTE — PROGRESS NOTES
SUBJECTIVE:     Chief Complaint & History of Present Illness:  Celi Bermeo is a  Established  patient 58 y.o. female who is seen here today with a complaint of  left shoulder pain .  She is patient well-known to me was last seen treated the clinic for this condition 2023.  At that time we had arranged for MRI of the left shoulder she is here today to review MRI results and discuss treatment options.  She continues to struggle with pain in the shoulder difficulty with activities at shoulder height or greater and lifting any objects of weight.  On a scale of 1-10, with 10 being worst pain imaginable, he rates this pain as 3 on good days and 6 on bad days.  she describes the pain as sore.    Review of patient's allergies indicates:  No Known Allergies      Current Outpatient Medications   Medication Sig Dispense Refill    semaglutide, weight loss, (WEGOVY) 2.4 mg/0.75 mL PnIj Inject 2.4 mg into the skin every 7 days. 3 mL 15    valACYclovir (VALTREX) 1000 MG tablet Take 1 tablet (1,000 mg total) by mouth every 8 (eight) hours as needed (For cold sores. Take for 7 days.). 21 tablet 5     No current facility-administered medications for this visit.       Past Medical History:   Diagnosis Date    History of cold sores     Hyperlipidemia     IFG (impaired fasting glucose) 2019    Increased body mass index (BMI)        Past Surgical History:   Procedure Laterality Date    APPENDECTOMY  1979     SECTION         Vital Signs (Most Recent)  There were no vitals filed for this visit.    Review of Systems:  ROS:  Constitutional: no fever or chills  Eyes: no visual changes  ENT: no nasal congestion or sore throat  Respiratory: no cough or shortness of breath  Cardiovascular: no chest pain or palpitations  Gastrointestinal: no nausea or vomiting, tolerating diet  Genitourinary: no hematuria or dysuria  Integument/Breast: no rash or pruritis  Hematologic/Lymphatic: no easy bruising or  lymphadenopathy  Musculoskeletal: no arthralgias or myalgias  Neurological: no seizures or tremors  Behavioral/Psych: no auditory or visual hallucinations  Endocrine: no heat or cold intolerance      OBJECTIVE:     PHYSICAL EXAM:     , General Appearance: Well nourished, well developed, in no acute distress.  Neurological: Mood & affect are normal.  Shoulder exam:  left  Tenderness: AC joint, lateral acromial  ROM: forward flexion 180/180, extension 45/45, full abduction 180/180, abduction-glenohumeral 90/90, external rotation 50/50, pain at the extremes of mobility  Shoulder Strength: biceps 5/5, triceps 5/5, abduction 5/5, adduction 5/5, external rotation 5/5 with shoulder at side, flexion 5/5, and extension 5/5  negative for tenderness about the glenohumeral joint, positive for tenderness over the acromioclavicular joint, and negative for impingement sign  Stability tests: anterior apprehension test negative and posterior apprehension test negative  Special Tests:Cross-chest abduction: negative and Norfolk's test: negative       Shoulder exam:  right  Tenderness: none  ROM: forward flexion 180/180, extension 45/45, full abduction 180/180, abduction-glenohumeral 90/90, external rotation 50/50  Shoulder Strength: biceps 5/5, triceps 5/5, abduction 5/5, adduction 5/5, external rotation 5/5 with shoulder at side, flexion 5/5, and extension 5/5  negative for tenderness about the glenohumeral joint, negative for tenderness over the acromioclavicular joint, and negative for impingement sign  Stability tests: anterior apprehension test negative and posterior apprehension test negative  Special Tests:Cross-chest abduction: negative and Norfolk's test: negative                RADIOGRAPHS:  Review of MRI demonstrates high-grade intrasubstance tear of the supraspinatus without full-thickness or retraction.  Significant labral degeneration throughout the labrum tenosynovitis of the biceps tendon supraspinatus infraspinatus  and early signs of potential adhesive capsulitis    ASSESSMENT/PLAN:       ICD-10-CM ICD-9-CM   1. Chronic left shoulder pain  M25.512 719.41    G89.29 338.29       Plan: We discussed with the patient at length all the different treatment options available for her left shoulder including anti-inflammatories, acetaminophen, rest, ice, Physical therapy to include strengthening exercise, occasional cortisone injections for temporary relief, arthroscopic surgical repair, and finally shoulder arthroplasty.   Will proceed with therapeutic diagnostic cortisone injection of the left shoulder followed by course of physical therapy to include dry needling      The risks, benefits, pros, cons, and potential side effects of the procedure were discussed with the patient in detail all questions were answered.  The patient is comfortable and willing to proceed with the procedure. Verbal consent was obtained and the proper joint was identified by the patient and provider      The injection site was identified and the skin was prepared with an ETOH solution. The    left  shoulder was injected with 1 ml of Celestone and 5 ml Lidocaine under sterile technique. Celi Bermeo tolerated the procedure well, she was advised to rest the  shoulder  today, ice and support. she did receive immediate relief of the pain in and about her  shoulder  she was told this would be short lived and is secondary to the lidocaine. she may have an increase in her discomfort tonight followed by steady improvement over the next several days. It may take 1-3 weeks following the injection to get the full benefit of the medication.  I will see her back in 4-6 weeks. Sooner if she has any problems or concerns.

## 2023-12-08 ENCOUNTER — CLINICAL SUPPORT (OUTPATIENT)
Dept: REHABILITATION | Facility: HOSPITAL | Age: 58
End: 2023-12-08
Payer: COMMERCIAL

## 2023-12-08 DIAGNOSIS — R29.898 SHOULDER WEAKNESS: ICD-10-CM

## 2023-12-08 DIAGNOSIS — G89.29 CHRONIC LEFT SHOULDER PAIN: Primary | ICD-10-CM

## 2023-12-08 DIAGNOSIS — M25.512 CHRONIC LEFT SHOULDER PAIN: Primary | ICD-10-CM

## 2023-12-08 PROCEDURE — 97014 ELECTRIC STIMULATION THERAPY: CPT

## 2023-12-08 PROCEDURE — 97140 MANUAL THERAPY 1/> REGIONS: CPT

## 2023-12-08 PROCEDURE — 97110 THERAPEUTIC EXERCISES: CPT

## 2023-12-08 NOTE — PROGRESS NOTES
"[facility]   Physical Therapy Treatment Note      Name: Celi Bermeo  Clinic Number: [id number]    Therapy Diagnosis:   Encounter Diagnoses   Name Primary?    Chronic left shoulder pain Yes    Shoulder weakness        Physician: Ander Moreno MD    Visit Date: 12/8/2023    Physician Orders: PT Eval and Treat   Medical Diagnosis from Referral: M25.512,G89.29 (ICD-10-CM) - Chronic left shoulder pain   Evaluation Date: 11/16/2023  Authorization Period Expiration: 10/19/24, 12/31/23  Plan of Care Expiration: 2/16/24  Progress Note Due: 12/16/23  Visit # / Visits authorized: 1/ 1, 2/20  FOTO: 1/ 3     Precautions: Standard      PTA Visit #: 1/5     Time In: 12:15 pm   Time Out: 1:00pm   Total Billable Time: 45 minutes    Subjective     Pt reports: that she is still having constant pain in her shoulder but it gets worse when she moves it a certain direction.  She was compliant with home exercise program.  Response to previous treatment: no adverse effects   Functional change: none at this time     Pain: 2/10  Location: left shoulder      Objective      Objective Measures updated at progress report unless specified.     Treatment     Celi received the treatments listed below:      therapeutic exercises to develop strength, endurance, ROM, and flexibility for 25 minutes including:    [x]pulleys (flex/scap) 3 min ea 5" hold   [x] supine shoulder flexion AAROM 2x10 (c/ e-stim)   [x] supine shoulder Abd AAROM 2x10  (c/ e-stim) LUE  [] Supine wand ER 2x10 (c/ e-stim) LUE  []  scap retractions 2x10 3-5"   []   []          manual therapy techniques:  were applied to the: left shoulder for 20 minutes, including:    [x]joint mobs GH (posterior/inf) Gr1-2, passive range of motion  [x] Dry needling L GH joint capsule, deltoid insertion with (5) 40 mm needles for 15 min     See EMR under MEDIA for written consent provided.    Palpation Assessment to determine the necessity for Functional Dry " "Needling          neuromuscular re-education activities to improve: Proprioception, Posture, and Motor Control for 0  minutes. The following activities were included:  []shoulder isometrics (flex/abd/IR/ER/ext) 5" 10x ea LUE       supervised modalities after being cleared for contradictions:  [] IFC Electrical Stimulation:  Celi received IFC Electrical Stimulation for pain control applied to the left shoulder. Pt received for 0 minutes during therex. Celi tolderated treatment well without any adverse effects.      therapeutic activities to improve functional performance for 0  minutes, including:      Patient Education and Home Exercises       Education provided:   - HEP    Written Home Exercises Provided: Patient instructed to cont prior HEP. Exercises were reviewed and Celi was able to demonstrate them prior to the end of the session.  Celi demonstrated good  understanding of the education provided. See EMR under Patient Instructions for exercises provided during therapy sessions    Assessment     Continued shoulder AAROM and passive joint mobilizations to maintain range of motion & reduced pain. Dry needling performed for pain reduction, assess response at next session and continue as needed.     Celi Is progressing well towards her goals.   Pt prognosis is Good.     Pt will continue to benefit from skilled outpatient physical therapy to address the deficits listed in the problem list box on initial evaluation, provide pt/family education and to maximize pt's level of independence in the home and community environment.     Pt's spiritual, cultural and educational needs considered and pt agreeable to plan of care and goals.     Anticipated barriers to physical therapy: none    Goals:   Short Term Goals: 6 visits  1. Pt will be (I) /c HEP to supplement PT in order to improve functional tasks  2. Pt will improve B manuel-scaps MMTs to 4-/5 grossly to improve strength for functional activities  3. Pt will " achieve L shoulder flexion >/= 140deg with pain <3/10        Long Term Goals: 12 visits   1. Pt will improve B manuel-scaps  MMTs to 4/5 grossly to improve strength for functional activities  2. Pt will improve FOTO score to </= 60% to reduce perceived pain with mobility   4. Pt will achieve L shoulder internal rotation to T10 in order to perform UB dressing     Plan     Plan of care Certification: 11/16/2023 to 2/16/24.    Sandrine Lane, PT

## 2023-12-14 ENCOUNTER — CLINICAL SUPPORT (OUTPATIENT)
Dept: REHABILITATION | Facility: HOSPITAL | Age: 58
End: 2023-12-14
Payer: COMMERCIAL

## 2023-12-14 DIAGNOSIS — G89.29 CHRONIC LEFT SHOULDER PAIN: Primary | ICD-10-CM

## 2023-12-14 DIAGNOSIS — R29.898 SHOULDER WEAKNESS: ICD-10-CM

## 2023-12-14 DIAGNOSIS — M25.512 CHRONIC LEFT SHOULDER PAIN: Primary | ICD-10-CM

## 2023-12-14 PROCEDURE — 97014 ELECTRIC STIMULATION THERAPY: CPT

## 2023-12-14 PROCEDURE — 97140 MANUAL THERAPY 1/> REGIONS: CPT

## 2023-12-14 PROCEDURE — 97110 THERAPEUTIC EXERCISES: CPT

## 2023-12-14 PROCEDURE — 97112 NEUROMUSCULAR REEDUCATION: CPT

## 2023-12-14 NOTE — PROGRESS NOTES
"[facility]   Physical Therapy Treatment Note      Name: Celi Bermeo  Clinic Number: [id number]    Therapy Diagnosis:   Encounter Diagnoses   Name Primary?    Chronic left shoulder pain Yes    Shoulder weakness          Physician: Ander Moreno MD    Visit Date: 12/14/2023    Physician Orders: PT Eval and Treat   Medical Diagnosis from Referral: M25.512,G89.29 (ICD-10-CM) - Chronic left shoulder pain   Evaluation Date: 11/16/2023  Authorization Period Expiration: 10/19/24, 12/31/23  Plan of Care Expiration: 2/16/24  Progress Note Due: 12/16/23  Visit # / Visits authorized: 1/ 1, 3/20  FOTO: 1/ 3     Precautions: Standard      PTA Visit #: 1/5     Time In: 12:20 pm   Time Out: 1:05pm   Total Billable Time: 45 minutes    Subjective     Pt reports: she thinks her shoulder feels a little better, but sleep is still difficult   She was compliant with home exercise program.  Response to previous treatment: no adverse effects   Functional change: none at this time     Pain: 2/10  Location: left shoulder      Objective      Objective Measures updated at progress report unless specified.     Treatment     Celi received the treatments listed below:      therapeutic exercises to develop strength, endurance, ROM, and flexibility for 15 minutes including:    [x]pulleys (flex/scap) 3 min ea 5" hold   [x] supine shoulder flexion AAROM 2x10  [x] supine shoulder Abd AAROM 2x10  LUE  [] Supine wand ER 2x10 (c/ e-stim) LUE  [] scap retractions 2x10 3-5"   []   []          manual therapy techniques:  were applied to the: left shoulder for 20 20minutes, including:    [x]joint mobs GH (posterior/inf) Gr1-2, passive range of motion  [x] Dry needling L GH joint capsule, deltoid insertion with (5) 40 mm needles for 15 min     See EMR under MEDIA for written consent provided.    Palpation Assessment to determine the necessity for Functional Dry Needling        neuromuscular re-education activities to " "improve: Proprioception, Posture, and Motor Control for 10  minutes. The following activities were included:  [x]shoulder isometrics (flex/abd) 5" 10x ea LUE    []IR/ER/ext     [x]supine rhythmic stabilization left upper extremity       supervised modalities after being cleared for contradictions:  [] IFC Electrical Stimulation:  Celi received IFC Electrical Stimulation for pain control applied to the left shoulder. Pt received for 0 minutes during therex. Celi tolderated treatment well without any adverse effects.      therapeutic activities to improve functional performance for 0  minutes, including:      Patient Education and Home Exercises       Education provided:   - HEP    Written Home Exercises Provided: Patient instructed to cont prior HEP. Exercises were reviewed and Celi was able to demonstrate them prior to the end of the session.  Celi demonstrated good  understanding of the education provided. See EMR under Patient Instructions for exercises provided during therapy sessions    Assessment     Continued isometrics with verbal/tactile cues required to avoid compensatory shoulder shrug, therefore not added to HEP. Dry needling performed for pain reduction, assess response at next session and continue as needed.     Celi Is progressing well towards her goals.   Pt prognosis is Good.     Pt will continue to benefit from skilled outpatient physical therapy to address the deficits listed in the problem list box on initial evaluation, provide pt/family education and to maximize pt's level of independence in the home and community environment.     Pt's spiritual, cultural and educational needs considered and pt agreeable to plan of care and goals.     Anticipated barriers to physical therapy: none    Goals:   Short Term Goals: 6 visits  1. Pt will be (I) /c HEP to supplement PT in order to improve functional tasks  2. Pt will improve B manuel-scaps MMTs to 4-/5 grossly to improve strength for functional " activities  3. Pt will achieve L shoulder flexion >/= 140deg with pain <3/10        Long Term Goals: 12 visits   1. Pt will improve B manuel-scaps  MMTs to 4/5 grossly to improve strength for functional activities  2. Pt will improve FOTO score to </= 60% to reduce perceived pain with mobility   4. Pt will achieve L shoulder internal rotation to T10 in order to perform UB dressing     Plan     Plan of care Certification: 11/16/2023 to 2/16/24.    Sandrine Lane, PT

## 2023-12-18 ENCOUNTER — OFFICE VISIT (OUTPATIENT)
Dept: SPORTS MEDICINE | Facility: CLINIC | Age: 58
End: 2023-12-18
Payer: COMMERCIAL

## 2023-12-18 VITALS
SYSTOLIC BLOOD PRESSURE: 108 MMHG | DIASTOLIC BLOOD PRESSURE: 77 MMHG | WEIGHT: 171.94 LBS | HEIGHT: 67 IN | HEART RATE: 93 BPM | BODY MASS INDEX: 26.99 KG/M2

## 2023-12-18 DIAGNOSIS — M75.122 NONTRAUMATIC COMPLETE TEAR OF LEFT ROTATOR CUFF: Primary | ICD-10-CM

## 2023-12-18 PROCEDURE — 3008F BODY MASS INDEX DOCD: CPT | Mod: CPTII,S$GLB,, | Performed by: ORTHOPAEDIC SURGERY

## 2023-12-18 PROCEDURE — 3074F PR MOST RECENT SYSTOLIC BLOOD PRESSURE < 130 MM HG: ICD-10-PCS | Mod: CPTII,S$GLB,, | Performed by: ORTHOPAEDIC SURGERY

## 2023-12-18 PROCEDURE — 1159F MED LIST DOCD IN RCRD: CPT | Mod: CPTII,S$GLB,, | Performed by: ORTHOPAEDIC SURGERY

## 2023-12-18 PROCEDURE — 1159F PR MEDICATION LIST DOCUMENTED IN MEDICAL RECORD: ICD-10-PCS | Mod: CPTII,S$GLB,, | Performed by: ORTHOPAEDIC SURGERY

## 2023-12-18 PROCEDURE — 99214 OFFICE O/P EST MOD 30 MIN: CPT | Mod: S$GLB,,, | Performed by: ORTHOPAEDIC SURGERY

## 2023-12-18 PROCEDURE — 3044F HG A1C LEVEL LT 7.0%: CPT | Mod: CPTII,S$GLB,, | Performed by: ORTHOPAEDIC SURGERY

## 2023-12-18 PROCEDURE — 3008F PR BODY MASS INDEX (BMI) DOCUMENTED: ICD-10-PCS | Mod: CPTII,S$GLB,, | Performed by: ORTHOPAEDIC SURGERY

## 2023-12-18 PROCEDURE — 3078F PR MOST RECENT DIASTOLIC BLOOD PRESSURE < 80 MM HG: ICD-10-PCS | Mod: CPTII,S$GLB,, | Performed by: ORTHOPAEDIC SURGERY

## 2023-12-18 PROCEDURE — 3074F SYST BP LT 130 MM HG: CPT | Mod: CPTII,S$GLB,, | Performed by: ORTHOPAEDIC SURGERY

## 2023-12-18 PROCEDURE — 3044F PR MOST RECENT HEMOGLOBIN A1C LEVEL <7.0%: ICD-10-PCS | Mod: CPTII,S$GLB,, | Performed by: ORTHOPAEDIC SURGERY

## 2023-12-18 PROCEDURE — 3078F DIAST BP <80 MM HG: CPT | Mod: CPTII,S$GLB,, | Performed by: ORTHOPAEDIC SURGERY

## 2023-12-18 PROCEDURE — 99999 PR PBB SHADOW E&M-EST. PATIENT-LVL III: ICD-10-PCS | Mod: PBBFAC,,, | Performed by: ORTHOPAEDIC SURGERY

## 2023-12-18 PROCEDURE — 99214 PR OFFICE/OUTPT VISIT, EST, LEVL IV, 30-39 MIN: ICD-10-PCS | Mod: S$GLB,,, | Performed by: ORTHOPAEDIC SURGERY

## 2023-12-18 PROCEDURE — 99999 PR PBB SHADOW E&M-EST. PATIENT-LVL III: CPT | Mod: PBBFAC,,, | Performed by: ORTHOPAEDIC SURGERY

## 2023-12-18 NOTE — PROGRESS NOTES
CC: left shoulder pain     58 y.o. Female with left shoulder pain who reports that the pain began 2 months ago. She reports that the pain is located at the anterolateral shoulder that radiates down her arm to the elbow and sometimes the wrist. She reports that the pain is worse with overhead activity. It also bothers her at night and wakes her from sleep. She has pain with internal rotation of the arm. Pain is improved with with NSAIDs and PT (5 sessions) with moderate relief of her pain. Denies any numbness or tingling.     Is affecting ADLs.     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 She is right hand dominant.     Review of Systems   Constitution: Negative. Negative for chills, fever and night sweats.   HENT: Negative for congestion and headaches.    Eyes: Negative for blurred vision, left vision loss and right vision loss.   Cardiovascular: Negative for chest pain and syncope.   Respiratory: Negative for cough and shortness of breath.    Endocrine: Negative for polydipsia, polyphagia and polyuria.   Hematologic/Lymphatic: Negative for bleeding problem. Does not bruise/bleed easily.   Skin: Negative for dry skin, itching and rash.   Musculoskeletal: Negative for falls and muscle weakness.   Gastrointestinal: Negative for abdominal pain and bowel incontinence.   Genitourinary: Negative for bladder incontinence and nocturia.   Neurological: Negative for disturbances in coordination, loss of balance and seizures.   Psychiatric/Behavioral: Negative for depression. The patient does not have insomnia.    Allergic/Immunologic: Negative for hives and persistent infections.     PAST MEDICAL HISTORY:   Past Medical History:   Diagnosis Date    History of cold sores     Hyperlipidemia 2021    IFG (impaired fasting glucose) 2019    Increased body mass index (BMI)      PAST SURGICAL HISTORY:   Past Surgical History:   Procedure Laterality Date    APPENDECTOMY  " 1979     SECTION       FAMILY HISTORY:   Family History   Problem Relation Age of Onset    Ulcers Mother         Duodenal ulcer    Diabetes type II Father     Colon cancer Neg Hx      SOCIAL HISTORY:   Social History     Socioeconomic History    Marital status: Unknown   Occupational History    Occupation: Pediatric endocrinologist   Tobacco Use    Smoking status: Never    Smokeless tobacco: Never   Substance and Sexual Activity    Alcohol use: Yes     Comment: socially    Drug use: Never    Sexual activity: Yes     Partners: Male       MEDICATIONS:   Current Outpatient Medications:     semaglutide, weight loss, (WEGOVY) 2.4 mg/0.75 mL PnIj, Inject 2.4 mg into the skin every 7 days., Disp: 3 mL, Rfl: 15    valACYclovir (VALTREX) 1000 MG tablet, Take 1 tablet (1,000 mg total) by mouth every 8 (eight) hours as needed (For cold sores. Take for 7 days.)., Disp: 21 tablet, Rfl: 5  ALLERGIES: Review of patient's allergies indicates:  No Known Allergies    VITAL SIGNS: /77   Pulse 93   Ht 5' 7" (1.702 m)   Wt 78 kg (171 lb 15.3 oz)   BMI 26.93 kg/m²      PHYSICAL EXAMINATION:  General:  The patient is alert and oriented x 3.  Mood is pleasant.  Observation of ears, eyes and nose reveal no gross abnormalities.  No labored breathing observed.  Gait is coordinated. Patient can toe walk and heel walk without difficulty.      left SHOULDER / UPPER EXTREMITY EXAM    OBSERVATION:     Swelling  none  Deformity  none   Discoloration  none   Scapular winging none   Scars   none  Atrophy  none    TENDERNESS / CREPITUS (T/C):          T/C      T/C   Clavicle   -/-  SUPRAspinatus    -/-     AC Jt.    -/-  INFRAspinatus  -/-    SC Jt.    -/-  Deltoid    -/-      G. Tuberosity  -/-  LH BICEP groove  +/-   Acromion:  -/-  Midline Neck   -/-     Scapular Spine -/-  Trapezium   -/-   SMA Scapula  -/-  GH jt. line - post  -/-     Scapulothoracic  -/-         ROM: (* = with pain)  Right shoulder   Left shoulder "        AROM (PROM)   AROM (PROM)   FE    170° (175°)     150° (165°)     ER at 0°    60°  (65°)    20°  (45°)   ER at 90° ABD  90°  (90°)    90°  (90°)   IR at 90°  ABD   NA  (40°)     NA  (40°)      IR (spine level)   T10     Pelvis    STRENGTH: (* = with pain) RIGHT SHOULDER  LEFT SHOULDER   SCAPTION at 0  5/5    4/5*   SCAPTION at 30  5/5    4+/5*    IR    5/5    5/5*   ER    5/5    5/5   BICEPS   5/5    5/5   Deltoid    5/5    5/5     SIGNS:  Painful side       NEER   +    OYANYS  +    KNOX   +    SPEEDS  +     DROP ARM   neg   BELLY PRESS neg   Superior escape none    LIFT-OFF  neg   X-Body ADD    neg    MOVING VALGUS neg    Bear Hug   +       STABILITY TESTING    RIGHT SHOULDER   LEFT SHOULDER     Translation     Anterior  up face     up face    Posterior  up face    up face    Sulcus   < 10mm    < 10 mm     Signs   Apprehension   neg      neg       Relocation   no change     no change      Jerk test  neg     neg    EXTREMITY NEURO-VASCULAR EXAM    Sensation grossly intact to light touch all dermatomal regions.    DTR 2+ Biceps, Triceps, BR and Negative Sallys sign   Grossly intact motor function at Elbow, Wrist and Hand   Distal pulses radial and ulnar 2+, brisk cap refill, symmetric.      NECK:  Painless FROM and spinous processes non-tender. Negative Spurlings sign.      OTHER FINDINGS:  + scapular dyskinesia    XRAYS reviewed and interpreted personally by me:  Shoulder trauma series left,  were obtained and reviewed  No convincing fracture or dislocation is noted. The osseous structures appear well mineralized and well aligned    MRI reviewed and interpreted personally by me::  Left rotator cuff tear  SLAP           ASSESSMENT:   left:  1. Shoulder rotator cuff tear   2.  Scapular dyskinesia    PLAN:    Follow up in 6 weeks after continued PT  PT for scapular stabilization: Scapular dyskinesia   Scapular stabilization - Dorcas protocol, 1-3x/week x 6-8 weeks with HEP    All questions were  answered, pt will contact us for questions or concerns in the interim.    I made the decision to obtain old records of the patient including previous notes and imaging. New imaging was ordered today of the extremity or extremities evaluated. I independently reviewed and interpreted the radiographs and/or MRIs today as well as prior imaging.

## 2023-12-19 ENCOUNTER — CLINICAL SUPPORT (OUTPATIENT)
Dept: REHABILITATION | Facility: HOSPITAL | Age: 58
End: 2023-12-19
Payer: COMMERCIAL

## 2023-12-19 DIAGNOSIS — R29.898 SHOULDER WEAKNESS: ICD-10-CM

## 2023-12-19 DIAGNOSIS — M25.512 CHRONIC LEFT SHOULDER PAIN: Primary | ICD-10-CM

## 2023-12-19 DIAGNOSIS — G89.29 CHRONIC LEFT SHOULDER PAIN: Primary | ICD-10-CM

## 2023-12-19 PROCEDURE — 97014 ELECTRIC STIMULATION THERAPY: CPT

## 2023-12-19 PROCEDURE — 97112 NEUROMUSCULAR REEDUCATION: CPT

## 2023-12-19 PROCEDURE — 97140 MANUAL THERAPY 1/> REGIONS: CPT

## 2023-12-19 PROCEDURE — 97110 THERAPEUTIC EXERCISES: CPT

## 2023-12-19 NOTE — PROGRESS NOTES
"[facility]   Physical Therapy Treatment Note      Name: Celi Bermeo  Clinic Number: [id number]    Therapy Diagnosis:   Encounter Diagnoses   Name Primary?    Chronic left shoulder pain Yes    Shoulder weakness        Physician: Ander Moreno MD    Visit Date: 12/19/2023    Physician Orders: PT Eval and Treat   Medical Diagnosis from Referral: M25.512,G89.29 (ICD-10-CM) - Chronic left shoulder pain   Evaluation Date: 11/16/2023  Authorization Period Expiration: 10/19/24, 12/31/23  Plan of Care Expiration: 2/16/24  Progress Note Due: 12/16/23  Visit # / Visits authorized: 1/ 1, 3/20  FOTO: 1/ 3     Precautions: Standard      PTA Visit #: 1/5     Time In: 9:15am   Time Out: 10am   Total Billable Time: 45 minutes    Subjective     Pt reports: her pain is reducing with dry needling. She saw her MD yesterday and will return in 6 weeks   She was compliant with home exercise program.  Response to previous treatment: no adverse effects   Functional change: none at this time     Pain: 2/10  Location: left shoulder      Objective      Objective Measures updated at progress report unless specified.     Treatment     Celi received the treatments listed below:      therapeutic exercises to develop strength, endurance, ROM, and flexibility for 10 minutes including:    [x]pulleys (flex/scap) 3 min ea 5" hold   [x] supine shoulder flexion AAROM 1# 10" x10   [x] supine shoulder Abd AAROM 2x10  LUE  [] Supine wand ER 2x10 (c/ e-stim) LUE  [] scap retractions 2x10 3-5"   []   []      manual therapy techniques:  were applied to the: left shoulder for  20minutes, including:    [x]joint mobs GH (posterior/inf) Gr1-2, passive range of motion  [x] Dry needling L GH joint capsule, deltoid insertion with (5) 40 mm needles for 15 min     See EMR under MEDIA for written consent provided.    Palpation Assessment to determine the necessity for Functional Dry Needling        neuromuscular re-education " "activities to improve: Proprioception, Posture, and Motor Control for 15  minutes. The following activities were included:    [x]shoulder isometrics (flex/abd) 5" 15x ea LUE    [x]IR/ER/ext x15 ea  [x] SA punch in supine x15   [x] Iso lower trap (seated push into mat) x15  []supine rhythmic stabilization left upper extremity   [x] B shoulder ER Ylw TB x10     Next:  []resisted rows   []sidelying ER   []  []  []  []      supervised modalities after being cleared for contradictions:  [] IFC Electrical Stimulation:  Celi received IFC Electrical Stimulation for pain control applied to the left shoulder. Pt received for 0 minutes during therex. Celi tolderated treatment well without any adverse effects.      therapeutic activities to improve functional performance for 0  minutes, including:      Patient Education and Home Exercises       Education provided:   - HEP    Written Home Exercises Provided: Patient instructed to cont prior HEP. Exercises were reviewed and Celi was able to demonstrate them prior to the end of the session.  Celi demonstrated good  understanding of the education provided. See EMR under Patient Instructions for exercises provided during therapy sessions    Assessment     Progressed isometrics and added to HEP today due to improved form and less verbal cues required. Added manuel-scap stability and strength training with good tolerance.  Dry needling performed for pain reduction, assess response at next session and continue as needed.     Celi Is progressing well towards her goals.   Pt prognosis is Good.     Pt will continue to benefit from skilled outpatient physical therapy to address the deficits listed in the problem list box on initial evaluation, provide pt/family education and to maximize pt's level of independence in the home and community environment.     Pt's spiritual, cultural and educational needs considered and pt agreeable to plan of care and goals.     Anticipated barriers " to physical therapy: none    Goals:   Short Term Goals: 6 visits  1. Pt will be (I) /c HEP to supplement PT in order to improve functional tasks  2. Pt will improve B manuel-scaps MMTs to 4-/5 grossly to improve strength for functional activities  3. Pt will achieve L shoulder flexion >/= 140deg with pain <3/10        Long Term Goals: 12 visits   1. Pt will improve B manuel-scaps  MMTs to 4/5 grossly to improve strength for functional activities  2. Pt will improve FOTO score to </= 60% to reduce perceived pain with mobility   4. Pt will achieve L shoulder internal rotation to T10 in order to perform UB dressing     Plan     Plan of care Certification: 11/16/2023 to 2/16/24.    Sandrine Lane, PT

## 2023-12-21 ENCOUNTER — CLINICAL SUPPORT (OUTPATIENT)
Dept: REHABILITATION | Facility: HOSPITAL | Age: 58
End: 2023-12-21
Payer: COMMERCIAL

## 2023-12-21 DIAGNOSIS — E78.5 HYPERLIPIDEMIA, UNSPECIFIED HYPERLIPIDEMIA TYPE: Primary | ICD-10-CM

## 2023-12-21 DIAGNOSIS — G89.29 CHRONIC LEFT SHOULDER PAIN: Primary | ICD-10-CM

## 2023-12-21 DIAGNOSIS — M25.512 CHRONIC LEFT SHOULDER PAIN: Primary | ICD-10-CM

## 2023-12-21 DIAGNOSIS — R29.898 SHOULDER WEAKNESS: ICD-10-CM

## 2023-12-21 PROCEDURE — 97110 THERAPEUTIC EXERCISES: CPT

## 2023-12-21 PROCEDURE — 97140 MANUAL THERAPY 1/> REGIONS: CPT

## 2023-12-21 PROCEDURE — 97112 NEUROMUSCULAR REEDUCATION: CPT

## 2023-12-21 NOTE — PROGRESS NOTES
[facility]   Physical Therapy Treatment Note      Name: Celi Bermeo  Clinic Number: [id number]    Therapy Diagnosis:   Encounter Diagnoses   Name Primary?    Chronic left shoulder pain Yes    Shoulder weakness        Physician: Ander Moreno MD    Visit Date: 12/21/2023    Physician Orders: PT Eval and Treat   Medical Diagnosis from Referral: M25.512,G89.29 (ICD-10-CM) - Chronic left shoulder pain   Evaluation Date: 11/16/2023  Authorization Period Expiration: 10/19/24, 12/31/23  Plan of Care Expiration: 2/16/24  Progress Note Due: 12/16/23, 1/21/24  Visit # / Visits authorized: 1/ 1, 4/20  FOTO: 1/ 3     Precautions: Standard      PTA Visit #: 1/5     Time In: 12:25pm   Time Out: 1:10pm   Total Billable Time: 45 minutes    Subjective     Pt reports: her shoulder pain is reducing and she doesn't take pain meds anymore. She still has pain along L bicep/brachialis  She was compliant with home exercise program.  Response to previous treatment: no adverse effects   Functional change: none at this time     Pain: 2/10  Location: upper arm     Objective      11/16/23     Range of Motion/Strength:         Shoulder Right MMT Left MMT Pain/Dysfunction with Movement (pain=!)   AROM             flexion  150 deg 4/5  125 deg ! 3+/5 !                   abduction  150 deg 4/5  130 deg  3+/5 !     Internal rotation  Able to reach T8 behind back  4/5  Able to reach L1 behind back  3+/5     ER at 0° abd  Able to reach T2 behind head 4/5  Able to reach C5 behind head  3+/5 !     rhomboids   3+/5   3+/5     Mid trap   3+/5   3+/5     Lower trap    3+/5   3+/5        Elbow Right MMT Left MMT Pain/Dysfunction with Movement (pain=!)   AROM             flexion  WFL 4/5  WFL 4-/5     extension  WFL 4/5  WFL 4-/5        Special Tests:   Neg Cason Deyvi  Neg Neer  + Speeds  + Empty/Full Can  +painful Arc  +yergasons's    12/21/23     Range of Motion/Strength:         Shoulder Right MMT Left MMT  "Pain/Dysfunction with Movement (pain=!)   AROM             flexion  150 deg 4/5  130 deg (supine), 140 deg standing  3+/5                    abduction  150 deg 4/5  150 deg  3+/5 !     Internal rotation  Able to reach T8 behind back  4/5  Able to reach L1 behind back  3+/5     ER at 0° abd  Able to reach T2 behind head 4/5  Able to reach C5 behind head  3+/5      rhomboids   3+/5   3+/5     Mid trap   3+/5   3+/5     Lower trap    3+/5   3+/5        Elbow Right MMT Left MMT Pain/Dysfunction with Movement (pain=!)   AROM             flexion  WFL 4/5  WFL 4-/5     extension  WFL 4/5  WFL 4-/5        Special Tests:   Neg Cason Deyvi  Neg Neer  Neg  Speeds  + Empty/Full Can  +painful Arc  Neg crispin's    Treatment     Celi received the treatments listed below:      therapeutic exercises to develop strength, endurance, ROM, and flexibility for 10 minutes including:    [x]pulleys (flex/scap) 3 min ea 5" hold   [x] supine shoulder flexion AAROM 1# 10" x10   [x] supine shoulder Abd AAROM 2x10  LUE  [x] Supine wand ER 10" x10 LUE  [] scap retractions 2x10 3-5"   [x] internal rotation AAROM with wand behind back 5" x10   []      manual therapy techniques:  were applied to the: left shoulder for  20 minutes, including:    [x]joint mobs GH (posterior/inf) Gr1-2, passive range of motion  [x] Dry needling L GH joint capsule, deltoid insertion, brachialis with (5) 40 mm needles for 15 min     See EMR under MEDIA for written consent provided.    Palpation Assessment to determine the necessity for Functional Dry Needling        neuromuscular re-education activities to improve: Proprioception, Posture, and Motor Control for 15  minutes. The following activities were included:    []shoulder isometrics (flex/abd) 5" 15x ea LUE    []IR/ER/ext x15 ea  [x] SA punch in supine x15   [x] Iso lower trap (seated push into mat) x15  []supine rhythmic stabilization left upper extremity   [x] B shoulder ER Ylw TB 2x10   [x]open books on " wall (pain free range) x5 B    Next:  []resisted rows   []sidelying ER   []sidelying flexion   []sidelying abd     []      supervised modalities after being cleared for contradictions:  [] IFC Electrical Stimulation:  Celi received IFC Electrical Stimulation for pain control applied to the left shoulder. Pt received for 0 minutes during therex. Celi tolderated treatment well without any adverse effects.      therapeutic activities to improve functional performance for 0  minutes, including:      Patient Education and Home Exercises       Education provided:   - HEP    Written Home Exercises Provided: Patient instructed to cont prior HEP. Exercises were reviewed and Celi was able to demonstrate them prior to the end of the session.  Celi demonstrated good  understanding of the education provided. See EMR under Patient Instructions for exercises provided during therapy sessions    Assessment     Pt presents with improved L shoulder AROM in flexion/abduction, but no change in rotation range of motion Pain in shoulder is reduced, but upper arm pain remains and still limits her mobility and sleep. Internal rotation range of motion deficits limits her ability to perform UB dressing tasks. Plan to progress manuel-scap strengthening as tolerated.     Celi Is progressing well towards her goals.   Pt prognosis is Good.     Pt will continue to benefit from skilled outpatient physical therapy to address the deficits listed in the problem list box on initial evaluation, provide pt/family education and to maximize pt's level of independence in the home and community environment.     Pt's spiritual, cultural and educational needs considered and pt agreeable to plan of care and goals.     Anticipated barriers to physical therapy: none    Goals:   Short Term Goals: 6 visits  1. Pt will be (I) /c HEP to supplement PT in order to improve functional tasks (progressing)   2. Pt will improve B manuel-scaps MMTs to 4-/5 grossly to  improve strength for functional activities (progressing)   3. Pt will achieve L shoulder flexion >/= 140deg with pain <3/10 MET        Long Term Goals: 12 visits   1. Pt will improve B manuel-scaps  MMTs to 4/5 grossly to improve strength for functional activities (progressing)   2. Pt will improve FOTO score to </= 60% to reduce perceived pain with mobility (progressing)   4. Pt will achieve L shoulder internal rotation to T10 in order to perform UB dressing (progressing)     Plan     Plan of care Certification: 11/16/2023 to 2/16/24.  Progress manuel-scap and deltoid strengthening as tolerated.     Sandrine Lane, PT

## 2023-12-26 ENCOUNTER — LAB VISIT (OUTPATIENT)
Dept: LAB | Facility: HOSPITAL | Age: 58
End: 2023-12-26
Attending: INTERNAL MEDICINE
Payer: COMMERCIAL

## 2023-12-26 DIAGNOSIS — E78.5 HYPERLIPIDEMIA, UNSPECIFIED HYPERLIPIDEMIA TYPE: ICD-10-CM

## 2023-12-26 LAB
CHOLEST SERPL-MCNC: 188 MG/DL (ref 120–199)
CHOLEST/HDLC SERPL: 3.5 {RATIO} (ref 2–5)
HDLC SERPL-MCNC: 53 MG/DL (ref 40–75)
HDLC SERPL: 28.2 % (ref 20–50)
LDLC SERPL CALC-MCNC: 122.2 MG/DL (ref 63–159)
NONHDLC SERPL-MCNC: 135 MG/DL
TRIGL SERPL-MCNC: 64 MG/DL (ref 30–150)

## 2023-12-26 PROCEDURE — 36415 COLL VENOUS BLD VENIPUNCTURE: CPT | Performed by: INTERNAL MEDICINE

## 2023-12-26 PROCEDURE — 80061 LIPID PANEL: CPT | Performed by: INTERNAL MEDICINE

## 2024-01-02 ENCOUNTER — CLINICAL SUPPORT (OUTPATIENT)
Dept: REHABILITATION | Facility: HOSPITAL | Age: 59
End: 2024-01-02
Payer: COMMERCIAL

## 2024-01-02 DIAGNOSIS — M25.512 CHRONIC LEFT SHOULDER PAIN: Primary | ICD-10-CM

## 2024-01-02 DIAGNOSIS — G89.29 CHRONIC LEFT SHOULDER PAIN: Primary | ICD-10-CM

## 2024-01-02 DIAGNOSIS — R29.898 SHOULDER WEAKNESS: ICD-10-CM

## 2024-01-02 PROCEDURE — 97140 MANUAL THERAPY 1/> REGIONS: CPT

## 2024-01-02 PROCEDURE — 97014 ELECTRIC STIMULATION THERAPY: CPT

## 2024-01-02 PROCEDURE — 97530 THERAPEUTIC ACTIVITIES: CPT

## 2024-01-02 PROCEDURE — 97112 NEUROMUSCULAR REEDUCATION: CPT

## 2024-01-02 PROCEDURE — 97110 THERAPEUTIC EXERCISES: CPT

## 2024-01-02 NOTE — PROGRESS NOTES
[facility]   Physical Therapy Treatment Note      Name: Celi Bermeo  Clinic Number: [id number]    Therapy Diagnosis:   Encounter Diagnoses   Name Primary?    Chronic left shoulder pain Yes    Shoulder weakness          Physician: Ander Moreno MD    Visit Date: 1/2/2024    Physician Orders: PT Eval and Treat   Medical Diagnosis from Referral: M25.512,G89.29 (ICD-10-CM) - Chronic left shoulder pain   Evaluation Date: 11/16/2023  Authorization Period Expiration: 10/19/24, 12/31/23  Plan of Care Expiration: 2/16/24  Progress Note Due: 12/16/23, 1/21/24  Visit # / Visits authorized: 1/ 1, 5/20  FOTO: 1/ 3     Precautions: Standard      PTA Visit #: 1/5     Time In: 12:04pm   Time Out: 1:04pm   Total Billable Time: 60 minutes    Subjective     Pt reports: her shoulder is getting better, but still painful. She still wakes up at night, but if she does some exercises, the shoulder will feel better.   She was compliant with home exercise program.  Response to previous treatment: no adverse effects   Functional change: none at this time     Pain: 2/10  Location: upper arm     Objective      11/16/23     Range of Motion/Strength:         Shoulder Right MMT Left MMT Pain/Dysfunction with Movement (pain=!)   AROM             flexion  150 deg 4/5  125 deg ! 3+/5 !                   abduction  150 deg 4/5  130 deg  3+/5 !     Internal rotation  Able to reach T8 behind back  4/5  Able to reach L1 behind back  3+/5     ER at 0° abd  Able to reach T2 behind head 4/5  Able to reach C5 behind head  3+/5 !     rhomboids   3+/5   3+/5     Mid trap   3+/5   3+/5     Lower trap    3+/5   3+/5        Elbow Right MMT Left MMT Pain/Dysfunction with Movement (pain=!)   AROM             flexion  WFL 4/5  WFL 4-/5     extension  WFL 4/5  WFL 4-/5        Special Tests:   Neg Cason Deyvi  Neg Neer  + Speeds  + Empty/Full Can  +painful Arc  +yergasons's    12/21/23     Range of Motion/Strength:        "  Shoulder Right MMT Left MMT Pain/Dysfunction with Movement (pain=!)   AROM             flexion  150 deg 4/5  130 deg (supine), 140 deg standing  3+/5                    abduction  150 deg 4/5  150 deg  3+/5 !     Internal rotation  Able to reach T8 behind back  4/5  Able to reach L1 behind back  3+/5     ER at 0° abd  Able to reach T2 behind head 4/5  Able to reach C5 behind head  3+/5      rhomboids   3+/5   3+/5     Mid trap   3+/5   3+/5     Lower trap    3+/5   3+/5        Elbow Right MMT Left MMT Pain/Dysfunction with Movement (pain=!)   AROM             flexion  WFL 4/5  WFL 4-/5     extension  WFL 4/5  WFL 4-/5        Special Tests:   Neg Cason Deyvi  Neg Neer  Neg  Speeds  + Empty/Full Can  +painful Arc  Neg crispin's    Treatment     Celi received the treatments listed below:      therapeutic exercises to develop strength, endurance, ROM, and flexibility for 8 minutes including:    []pulleys (flex/scap) 3 min ea 5" hold   [x] supine shoulder flexion AAROM 1# 10" x10   [] supine shoulder Abd AAROM 2x10  LUE  [] Supine wand ER 10" x10 LUE  [] scap retractions 2x10 3-5"   [x] internal rotation AAROM with wand behind back 5" x10   [x] internal rotation raises behind back with wand x12     manual therapy techniques:  were applied to the: left shoulder for  23 minutes, including:    [x]joint mobs GH (posterior/inf) Gr1-2, passive range of motion  [x] Dry needling L GH joint capsule, deltoid insertion, brachialis with (5) 40 mm needles for 15 min     See EMR under MEDIA for written consent provided.    Palpation Assessment to determine the necessity for Functional Dry Needling        neuromuscular re-education activities to improve: Proprioception, Posture, and Motor Control for 14  minutes. The following activities were included:    []shoulder isometrics (flex/abd) 5" 15x ea LUE    []IR/ER/ext x15 ea  [] SA punch in supine x15   [] Iso lower trap (seated push into mat) x15  []supine rhythmic " stabilization left upper extremity   [] B shoulder ER Ylw TB 2x10   []open books on wall (pain free range) x5 B  [x]resisted rows green TB 2x10   [x] SAPD green TB 2x10     Next:    supervised modalities after being cleared for contradictions:  [] IFC Electrical Stimulation:  Celi received IFC Electrical Stimulation for pain control applied to the left shoulder. Pt received for 0 minutes during therex. Celi tolderated treatment well without any adverse effects.      therapeutic activities to improve functional performance for 15 minutes, including:    [x]sidelying ER 2# x20   [x]sidelying flexion 2# x20   [x]sidelying abd 3# x20     Patient Education and Home Exercises       Education provided:   - HEP    Written Home Exercises Provided: Patient instructed to cont prior HEP. Exercises were reviewed and Celi was able to demonstrate them prior to the end of the session.  Celi demonstrated good  understanding of the education provided. See EMR under Patient Instructions for exercises provided during therapy sessions    Assessment     Pt continues to demonstrate over-activation of L upper trap with shrug sign noted at attempts at internal rotation. Progressed functional strength training with sidelying and resisted exercises today. No c/o increased pain throughout session. Continue progressing lower trap strengthening and internal rotation range of motion for improved posture and ability to perform UB dressing.     Celi Is progressing well towards her goals.   Pt prognosis is Good.     Pt will continue to benefit from skilled outpatient physical therapy to address the deficits listed in the problem list box on initial evaluation, provide pt/family education and to maximize pt's level of independence in the home and community environment.     Pt's spiritual, cultural and educational needs considered and pt agreeable to plan of care and goals.     Anticipated barriers to physical therapy: none    Goals:   Short  Term Goals: 6 visits  1. Pt will be (I) /c HEP to supplement PT in order to improve functional tasks (progressing)   2. Pt will improve B manuel-scaps MMTs to 4-/5 grossly to improve strength for functional activities (progressing)   3. Pt will achieve L shoulder flexion >/= 140deg with pain <3/10 MET        Long Term Goals: 12 visits   1. Pt will improve B manuel-scaps  MMTs to 4/5 grossly to improve strength for functional activities (progressing)   2. Pt will improve FOTO score to </= 60% to reduce perceived pain with mobility (progressing)   4. Pt will achieve L shoulder internal rotation to T10 in order to perform UB dressing (progressing)     Plan     Plan of care Certification: 11/16/2023 to 2/16/24.  Progress manuel-scap and deltoid strengthening as tolerated.     Sandrine Lane, PT

## 2024-01-04 NOTE — PROGRESS NOTES
[facility]   Physical Therapy Treatment Note      Name: Celi Bermeo  Clinic Number: [id number]    Therapy Diagnosis:   No diagnosis found.    Physician: Ander Moreno MD    Visit Date: 1/5/2024    Physician Orders: PT Eval and Treat   Medical Diagnosis from Referral: M25.512,G89.29 (ICD-10-CM) - Chronic left shoulder pain   Evaluation Date: 11/16/2023  Authorization Period Expiration: 10/19/24, 12/31/24  Plan of Care Expiration: 2/16/24  Progress Note Due: 12/16/23, 1/21/24  Visit # / Visits authorized: 6/20, 2/20  FOTO: 1/ 3     Precautions: Standard      PTA Visit #: 1/5     Time In: 12:15pm   Time Out: 1pm   Total Billable Time: 45  minutes    Subjective     Pt reports: her shoulder is getting better, but still painful. She still wakes up at night, but if she does some exercises, the shoulder will feel better.   She was compliant with home exercise program.  Response to previous treatment: no adverse effects   Functional change: none at this time     Pain: 2/10  Location: upper arm     Objective      11/16/23     Range of Motion/Strength:         Shoulder Right MMT Left MMT Pain/Dysfunction with Movement (pain=!)   AROM             flexion  150 deg 4/5  125 deg ! 3+/5 !                   abduction  150 deg 4/5  130 deg  3+/5 !     Internal rotation  Able to reach T8 behind back  4/5  Able to reach L1 behind back  3+/5     ER at 0° abd  Able to reach T2 behind head 4/5  Able to reach C5 behind head  3+/5 !     rhomboids   3+/5   3+/5     Mid trap   3+/5   3+/5     Lower trap    3+/5   3+/5        Elbow Right MMT Left MMT Pain/Dysfunction with Movement (pain=!)   AROM             flexion  WFL 4/5  WFL 4-/5     extension  WFL 4/5  WFL 4-/5        Special Tests:   Neg Cason Deyvi  Neg Neer  + Speeds  + Empty/Full Can  +painful Arc  +yergasons's    12/21/23     Range of Motion/Strength:         Shoulder Right MMT Left MMT Pain/Dysfunction with Movement (pain=!)   AROM           "   flexion  150 deg 4/5  130 deg (supine), 140 deg standing  3+/5                    abduction  150 deg 4/5  150 deg  3+/5 !     Internal rotation  Able to reach T8 behind back  4/5  Able to reach L1 behind back  3+/5     ER at 0° abd  Able to reach T2 behind head 4/5  Able to reach C5 behind head  3+/5      rhomboids   3+/5   3+/5     Mid trap   3+/5   3+/5     Lower trap    3+/5   3+/5        Elbow Right MMT Left MMT Pain/Dysfunction with Movement (pain=!)   AROM             flexion  WFL 4/5  WFL 4-/5     extension  WFL 4/5  WFL 4-/5        Special Tests:   Neg Cason Deyvi  Neg Neer  Neg  Speeds  + Empty/Full Can  +painful Arc  Neg crispin's    Treatment     Celi received the treatments listed below:      therapeutic exercises to develop strength, endurance, ROM, and flexibility for 8 minutes including:    []pulleys (flex/scap) 3 min ea 5" hold    [x]Pulleys internal rotation behind back 1'  [] supine shoulder flexion AAROM 1# 10" x10   [] supine shoulder Abd AAROM 2x10  LUE  [] Supine wand ER 10" x10 LUE  [] scap retractions 2x10 3-5"   [] internal rotation AAROM with wand behind back 5" x10   [x] internal rotation raises behind back with wand x15     manual therapy techniques:  were applied to the: left shoulder for  14 minutes, including:    [x]joint mobs GH (posterior/inf) Gr1-2, passive range of motion  [x] Dry needling L GH joint capsule, deltoid insertion, brachialis with (5) 40 mm needles for 15 min     See EMR under MEDIA for written consent provided.    Palpation Assessment to determine the necessity for Functional Dry Needling        neuromuscular re-education activities to improve: Proprioception, Posture, and Motor Control for 23   minutes. The following activities were included:    []shoulder isometrics (flex/abd) 5" 15x ea LUE    []IR/ER/ext x15 ea  [] SA punch in supine x15   [] Iso lower trap (seated push into mat) x15  []supine rhythmic stabilization left upper extremity   [x] B shoulder " ER Red TB 2x10    [x]Isometric L shoulder ER step outs red TB 2x10   []open books on wall (pain free range) x5 B  [x]resisted rows green TB 3x10   [x] SAPD green TB 2x10   [x]Plantigrade shoulder taps x20 B   [x]SA press ups in plantigrade x15    Next:    supervised modalities after being cleared for contradictions:  [] IFC Electrical Stimulation:  Celi received IFC Electrical Stimulation for pain control applied to the left shoulder. Pt received for 0 minutes during therex. Celi tolderated treatment well without any adverse effects.      therapeutic activities to improve functional performance for 0 minutes, including:    []sidelying ER 2# x20   []sidelying flexion 2# x20   []sidelying abd 3# x20     Patient Education and Home Exercises       Education provided:   - HEP    Written Home Exercises Provided: Patient instructed to cont prior HEP. Exercises were reviewed and Celi was able to demonstrate them prior to the end of the session.  Celi demonstrated good  understanding of the education provided. See EMR under Patient Instructions for exercises provided during therapy sessions    Assessment     Progressed scapular stability trianing with added CKC UE exercises today. Added isometric shoulder ER to HEP for NMR and postural control with movement. Continued dry needling for reduced shoulder pain at night. Progress as tolerated.     Celi Is progressing well towards her goals.   Pt prognosis is Good.     Pt will continue to benefit from skilled outpatient physical therapy to address the deficits listed in the problem list box on initial evaluation, provide pt/family education and to maximize pt's level of independence in the home and community environment.     Pt's spiritual, cultural and educational needs considered and pt agreeable to plan of care and goals.     Anticipated barriers to physical therapy: none    Goals:   Short Term Goals: 6 visits  1. Pt will be (I) /c HEP to supplement PT in order to  improve functional tasks (progressing)   2. Pt will improve B manuel-scaps MMTs to 4-/5 grossly to improve strength for functional activities (progressing)   3. Pt will achieve L shoulder flexion >/= 140deg with pain <3/10 MET        Long Term Goals: 12 visits   1. Pt will improve B manuel-scaps  MMTs to 4/5 grossly to improve strength for functional activities (progressing)   2. Pt will improve FOTO score to </= 60% to reduce perceived pain with mobility (progressing)   4. Pt will achieve L shoulder internal rotation to T10 in order to perform UB dressing (progressing)     Plan     Plan of care Certification: 11/16/2023 to 2/16/24.  Progress manule-scap and deltoid strengthening as tolerated.     Sandrine Lane, PT

## 2024-01-05 ENCOUNTER — CLINICAL SUPPORT (OUTPATIENT)
Dept: REHABILITATION | Facility: HOSPITAL | Age: 59
End: 2024-01-05
Payer: COMMERCIAL

## 2024-01-05 DIAGNOSIS — M25.512 CHRONIC LEFT SHOULDER PAIN: Primary | ICD-10-CM

## 2024-01-05 DIAGNOSIS — G89.29 CHRONIC LEFT SHOULDER PAIN: Primary | ICD-10-CM

## 2024-01-05 DIAGNOSIS — R29.898 SHOULDER WEAKNESS: ICD-10-CM

## 2024-01-05 PROCEDURE — 97014 ELECTRIC STIMULATION THERAPY: CPT

## 2024-01-05 PROCEDURE — 97110 THERAPEUTIC EXERCISES: CPT

## 2024-01-05 PROCEDURE — 97112 NEUROMUSCULAR REEDUCATION: CPT

## 2024-01-05 PROCEDURE — 97140 MANUAL THERAPY 1/> REGIONS: CPT

## 2024-01-08 ENCOUNTER — CLINICAL SUPPORT (OUTPATIENT)
Dept: REHABILITATION | Facility: HOSPITAL | Age: 59
End: 2024-01-08
Payer: COMMERCIAL

## 2024-01-08 DIAGNOSIS — G89.29 CHRONIC LEFT SHOULDER PAIN: Primary | ICD-10-CM

## 2024-01-08 DIAGNOSIS — R29.898 SHOULDER WEAKNESS: ICD-10-CM

## 2024-01-08 DIAGNOSIS — M25.512 CHRONIC LEFT SHOULDER PAIN: Primary | ICD-10-CM

## 2024-01-08 PROCEDURE — 97110 THERAPEUTIC EXERCISES: CPT

## 2024-01-08 PROCEDURE — 97014 ELECTRIC STIMULATION THERAPY: CPT

## 2024-01-08 PROCEDURE — 97112 NEUROMUSCULAR REEDUCATION: CPT

## 2024-01-08 PROCEDURE — 97140 MANUAL THERAPY 1/> REGIONS: CPT

## 2024-01-08 NOTE — PROGRESS NOTES
[facility]   Physical Therapy Treatment Note      Name: Celi Bermeo  Clinic Number: [id number]    Therapy Diagnosis:   Encounter Diagnoses   Name Primary?    Chronic left shoulder pain Yes    Shoulder weakness        Physician: Ander Moreno MD    Visit Date: 1/8/2024    Physician Orders: PT Eval and Treat   Medical Diagnosis from Referral: M25.512,G89.29 (ICD-10-CM) - Chronic left shoulder pain   Evaluation Date: 11/16/2023  Authorization Period Expiration: 10/19/24, 12/31/24  Plan of Care Expiration: 2/16/24  Progress Note Due: 12/16/23, 1/21/24  Visit # / Visits authorized: 6/20, 3/20  FOTO: 1/ 3     Precautions: Standard      PTA Visit #: 1/5     Time In: 12:15pm   Time Out: 1:10pm   Total Billable Time: 55  minutes    Subjective     Pt reports: her shoulder is getting better, but still painful. She still wakes up at night, but if she does some exercises, the shoulder will feel better.   She was compliant with home exercise program.  Response to previous treatment: no adverse effects   Functional change: none at this time     Pain: 2/10  Location: upper arm     Objective      11/16/23     Range of Motion/Strength:         Shoulder Right MMT Left MMT Pain/Dysfunction with Movement (pain=!)   AROM             flexion  150 deg 4/5  125 deg ! 3+/5 !                   abduction  150 deg 4/5  130 deg  3+/5 !     Internal rotation  Able to reach T8 behind back  4/5  Able to reach L1 behind back  3+/5     ER at 0° abd  Able to reach T2 behind head 4/5  Able to reach C5 behind head  3+/5 !     rhomboids   3+/5   3+/5     Mid trap   3+/5   3+/5     Lower trap    3+/5   3+/5        Elbow Right MMT Left MMT Pain/Dysfunction with Movement (pain=!)   AROM             flexion  WFL 4/5  WFL 4-/5     extension  WFL 4/5  WFL 4-/5        Special Tests:   Neg Cason Deyvi  Neg Neer  + Speeds  + Empty/Full Can  +painful Arc  +yergasons's    12/21/23     Range of Motion/Strength:        "  Shoulder Right MMT Left MMT Pain/Dysfunction with Movement (pain=!)   AROM             flexion  150 deg 4/5  130 deg (supine), 140 deg standing  3+/5                    abduction  150 deg 4/5  150 deg  3+/5 !     Internal rotation  Able to reach T8 behind back  4/5  Able to reach L1 behind back  3+/5     ER at 0° abd  Able to reach T2 behind head 4/5  Able to reach C5 behind head  3+/5      rhomboids   3+/5   3+/5     Mid trap   3+/5   3+/5     Lower trap    3+/5   3+/5        Elbow Right MMT Left MMT Pain/Dysfunction with Movement (pain=!)   AROM             flexion  WFL 4/5  WFL 4-/5     extension  WFL 4/5  WFL 4-/5        Special Tests:   Neg Cason Deyvi  Neg Neer  Neg  Speeds  + Empty/Full Can  +painful Arc  Neg crispin's    Treatment     Celi received the treatments listed below:      therapeutic exercises to develop strength, endurance, ROM, and flexibility for 10 minutes including:    []pulleys (flex/scap) 3 min ea 5" hold    []Pulleys internal rotation behind back 1'  [x] supine shoulder flexion AAROM 1# 10" x10   [x] supine shoulder Abd AAROM 2x10  LUE  [] Supine wand ER 10" x10 LUE  [] scap retractions 2x10 3-5"   [] internal rotation AAROM with wand behind back 5" x10   [] internal rotation raises behind back with wand x15  [x]UBE 4' Fwd     manual therapy techniques:  were applied to the: left shoulder for  15 minutes, including:    [x]joint mobs GH (posterior/inf) Gr1-2, passive range of motion  [x] Dry needling L GH joint capsule, deltoid insertion, brachialis with (5) 40 mm needles for 10 min     See EMR under MEDIA for written consent provided.    Palpation Assessment to determine the necessity for Functional Dry Needling        neuromuscular re-education activities to improve: Proprioception, Posture, and Motor Control for 30    minutes. The following activities were included:    []shoulder isometrics (flex/abd) 5" 15x ea LUE    []IR/ER/ext x15 ea  [] SA punch in supine x15   [] Iso lower " trap (seated push into mat) x15  []supine rhythmic stabilization left upper extremity   [x] B shoulder ER Red TB 3x10    [x]Isometric L shoulder ER step outs red TB 2x10      [x]open books on wall (pain free range) x5 B  []resisted rows green TB 3x10   [x] SAPD green TB 2x10   []Plantigrade shoulder taps x20 B   [x]SA press ups in plantigrade 2x10  [x] SA ball rotations on wall x20 CW/CCW    Next:      [] isometric shoulder internal rotation walk outs 2x10 red TB  []  []  []  []      supervised modalities after being cleared for contradictions:  [] IFC Electrical Stimulation:  Celi received IFC Electrical Stimulation for pain control applied to the left shoulder. Pt received for 0 minutes during therex. Celi tolderated treatment well without any adverse effects.      therapeutic activities to improve functional performance for 0 minutes, including:    []sidelying ER 2# x20   []sidelying flexion 2# x20   []sidelying abd 3# x20     Patient Education and Home Exercises       Education provided:   - HEP    Written Home Exercises Provided: Patient instructed to cont prior HEP. Exercises were reviewed and Celi was able to demonstrate them prior to the end of the session.  Celi demonstrated good  understanding of the education provided. See EMR under Patient Instructions for exercises provided during therapy sessions    Assessment     Progressed scapular stability training with added serratus ball rotations and continues manuel-scap strengthening for improved postural awareness and control. She continues to c/o shoulder pain, but states it is reducing distally. Cont dry needling as tolerated.     Celi Is progressing well towards her goals.   Pt prognosis is Good.     Pt will continue to benefit from skilled outpatient physical therapy to address the deficits listed in the problem list box on initial evaluation, provide pt/family education and to maximize pt's level of independence in the home and community  environment.     Pt's spiritual, cultural and educational needs considered and pt agreeable to plan of care and goals.     Anticipated barriers to physical therapy: none    Goals:   Short Term Goals: 6 visits  1. Pt will be (I) /c HEP to supplement PT in order to improve functional tasks (progressing)   2. Pt will improve B manuel-scaps MMTs to 4-/5 grossly to improve strength for functional activities (progressing)   3. Pt will achieve L shoulder flexion >/= 140deg with pain <3/10 MET        Long Term Goals: 12 visits   1. Pt will improve B manuel-scaps  MMTs to 4/5 grossly to improve strength for functional activities (progressing)   2. Pt will improve FOTO score to </= 60% to reduce perceived pain with mobility (progressing)   4. Pt will achieve L shoulder internal rotation to T10 in order to perform UB dressing (progressing)     Plan     Plan of care Certification: 11/16/2023 to 2/16/24.  Progress manuel-scap and deltoid strengthening as tolerated.     Sandrine Lane, PT

## 2024-01-10 NOTE — PROGRESS NOTES
[facility]   Physical Therapy Treatment Note      Name: Celi Bermeo  Clinic Number: [id number]    Therapy Diagnosis:   Encounter Diagnoses   Name Primary?    Chronic left shoulder pain Yes    Shoulder weakness        Physician: Ander Moreno MD    Visit Date: 1/11/2024    Physician Orders: PT Eval and Treat   Medical Diagnosis from Referral: M25.512,G89.29 (ICD-10-CM) - Chronic left shoulder pain   Evaluation Date: 11/16/2023  Authorization Period Expiration: 10/19/24, 12/31/24  Plan of Care Expiration: 2/16/24  Progress Note Due: 12/16/23, 1/21/24  Visit # / Visits authorized: 6/20, 4/20  FOTO: 1/ 3     Precautions: Standard      PTA Visit #: 1/5     Time In: 12:15pm   Time Out: 1:55pm   Total Billable Time: 55  minutes    Subjective     Pt reports: she still has difficulty with reaching behind her back   She was compliant with home exercise program.  Response to previous treatment: no adverse effects   Functional change: none at this time     Pain: 2/10  Location: upper arm     Objective      11/16/23     Range of Motion/Strength:         Shoulder Right MMT Left MMT Pain/Dysfunction with Movement (pain=!)   AROM             flexion  150 deg 4/5  125 deg ! 3+/5 !                   abduction  150 deg 4/5  130 deg  3+/5 !     Internal rotation  Able to reach T8 behind back  4/5  Able to reach L1 behind back  3+/5     ER at 0° abd  Able to reach T2 behind head 4/5  Able to reach C5 behind head  3+/5 !     rhomboids   3+/5   3+/5     Mid trap   3+/5   3+/5     Lower trap    3+/5   3+/5        Elbow Right MMT Left MMT Pain/Dysfunction with Movement (pain=!)   AROM             flexion  WFL 4/5  WFL 4-/5     extension  WFL 4/5  WFL 4-/5        Special Tests:   Neg Cason Deyvi  Neg Neer  + Speeds  + Empty/Full Can  +painful Arc  +yergasons's    12/21/23     Range of Motion/Strength:         Shoulder Right MMT Left MMT Pain/Dysfunction with Movement (pain=!)   AROM            "  flexion  150 deg 4/5  130 deg (supine), 140 deg standing  3+/5                    abduction  150 deg 4/5  150 deg  3+/5 !     Internal rotation  Able to reach T8 behind back  4/5  Able to reach L1 behind back  3+/5     ER at 0° abd  Able to reach T2 behind head 4/5  Able to reach C5 behind head  3+/5      rhomboids   3+/5   3+/5     Mid trap   3+/5   3+/5     Lower trap    3+/5   3+/5        Elbow Right MMT Left MMT Pain/Dysfunction with Movement (pain=!)   AROM             flexion  WFL 4/5  WFL 4-/5     extension  WFL 4/5  WFL 4-/5        Special Tests:   Neg Cason Deyvi  Neg Neer  Neg  Speeds  + Empty/Full Can  +painful Arc  Neg crispin's    Treatment     Celi received the treatments listed below:      therapeutic exercises to develop strength, endurance, ROM, and flexibility for 10 minutes including:    []pulleys (flex/scap) 3 min ea 5" hold    [x]Pulleys internal rotation behind back 1'  [] supine shoulder flexion AAROM 1# 10" x10   [] supine shoulder Abd AAROM 2x10  LUE  [] Supine wand ER 10" x10 LUE  [] scap retractions 2x10 3-5"   [] internal rotation AAROM with wand behind back 5" x10   [] internal rotation raises behind back with wand x15  [x] Internal rotation with strap behind back (static) 30" x3  [x]UBE 2' Fwd/2" bkwd      manual therapy techniques:  were applied to the: left shoulder for  14 minutes, including:    [x]joint mobs GH (posterior/inf) Gr1-2, passive range of motion  [x] Dry needling L GH joint capsule, deltoid insertion, brachialis with (5) 40 mm needles for 10 min     See EMR under MEDIA for written consent provided.    Palpation Assessment to determine the necessity for Functional Dry Needling        neuromuscular re-education activities to improve: Proprioception, Posture, and Motor Control for 23   minutes. The following activities were included:    []shoulder isometrics (flex/abd) 5" 15x ea LUE    []IR/ER/ext x15 ea  [] SA punch in supine x15   [x] Iso lower trap (seated push " into mat) x15  []supine rhythmic stabilization left upper extremity   [] B shoulder ER Red TB 3x10    []Isometric L shoulder ER step outs red TB 2x10      [x]open books on wall (pain free range) x8 B  []resisted rows green TB 3x10   [] SAPD green TB 2x10   [x]Plantigrade shoulder taps x20 B   []SA press ups in plantigrade 2x10  [] SA ball rotations on wall x20 CW/CCW  [x] Supine SA Ylw med ball rotations x20 cw/ccw  [x] high rows CC 7# B x15    Next:      [] isometric shoulder internal rotation walk outs 2x10 red TB    []  []  []      supervised modalities after being cleared for contradictions:  [] IFC Electrical Stimulation:  Celi received IFC Electrical Stimulation for pain control applied to the left shoulder. Pt received for 0 minutes during therex. Celi tolderated treatment well without any adverse effects.      therapeutic activities to improve functional performance for 8 minutes, including:    [x]sidelying ER 3# x20   [x]sidelying flexion 2# x20  [x]sidelying abd 3# x30     Patient Education and Home Exercises       Education provided:   - HEP    Written Home Exercises Provided: Patient instructed to cont prior HEP. Exercises were reviewed and Celi was able to demonstrate them prior to the end of the session.  Celi demonstrated good  understanding of the education provided. See EMR under Patient Instructions for exercises provided during therapy sessions    Assessment     Pt continues to require tactile cues for proper scapular setting/positioning during exercises. Focused on improving thoracic rotation and functional shoulder internal rotation in order to perform ADLs.     Celi Is progressing well towards her goals.   Pt prognosis is Good.     Pt will continue to benefit from skilled outpatient physical therapy to address the deficits listed in the problem list box on initial evaluation, provide pt/family education and to maximize pt's level of independence in the home and community environment.      Pt's spiritual, cultural and educational needs considered and pt agreeable to plan of care and goals.     Anticipated barriers to physical therapy: none    Goals:   Short Term Goals: 6 visits  1. Pt will be (I) /c HEP to supplement PT in order to improve functional tasks (progressing)   2. Pt will improve B manuel-scaps MMTs to 4-/5 grossly to improve strength for functional activities (progressing)   3. Pt will achieve L shoulder flexion >/= 140deg with pain <3/10 MET        Long Term Goals: 12 visits   1. Pt will improve B manuel-scaps  MMTs to 4/5 grossly to improve strength for functional activities (progressing)   2. Pt will improve FOTO score to </= 60% to reduce perceived pain with mobility (progressing)   4. Pt will achieve L shoulder internal rotation to T10 in order to perform UB dressing (progressing)     Plan     Plan of care Certification: 11/16/2023 to 2/16/24.  Progress manuel-scap and deltoid strengthening as tolerated.     Sandrine Lane, PT

## 2024-01-11 ENCOUNTER — CLINICAL SUPPORT (OUTPATIENT)
Dept: REHABILITATION | Facility: HOSPITAL | Age: 59
End: 2024-01-11
Payer: COMMERCIAL

## 2024-01-11 DIAGNOSIS — G89.29 CHRONIC LEFT SHOULDER PAIN: Primary | ICD-10-CM

## 2024-01-11 DIAGNOSIS — M25.512 CHRONIC LEFT SHOULDER PAIN: Primary | ICD-10-CM

## 2024-01-11 DIAGNOSIS — R29.898 SHOULDER WEAKNESS: ICD-10-CM

## 2024-01-11 PROCEDURE — 97140 MANUAL THERAPY 1/> REGIONS: CPT

## 2024-01-11 PROCEDURE — 97530 THERAPEUTIC ACTIVITIES: CPT

## 2024-01-11 PROCEDURE — 97014 ELECTRIC STIMULATION THERAPY: CPT

## 2024-01-11 PROCEDURE — 97110 THERAPEUTIC EXERCISES: CPT

## 2024-01-11 PROCEDURE — 97112 NEUROMUSCULAR REEDUCATION: CPT

## 2024-01-31 ENCOUNTER — OFFICE VISIT (OUTPATIENT)
Dept: SPORTS MEDICINE | Facility: CLINIC | Age: 59
End: 2024-01-31
Payer: COMMERCIAL

## 2024-01-31 VITALS
SYSTOLIC BLOOD PRESSURE: 116 MMHG | BODY MASS INDEX: 26.82 KG/M2 | WEIGHT: 170.88 LBS | DIASTOLIC BLOOD PRESSURE: 82 MMHG | HEIGHT: 67 IN | HEART RATE: 77 BPM

## 2024-01-31 DIAGNOSIS — S46.012D TRAUMATIC INCOMPLETE TEAR OF LEFT ROTATOR CUFF, SUBSEQUENT ENCOUNTER: Primary | ICD-10-CM

## 2024-01-31 PROCEDURE — 99214 OFFICE O/P EST MOD 30 MIN: CPT | Mod: S$GLB,,, | Performed by: ORTHOPAEDIC SURGERY

## 2024-01-31 PROCEDURE — 3079F DIAST BP 80-89 MM HG: CPT | Mod: CPTII,S$GLB,, | Performed by: ORTHOPAEDIC SURGERY

## 2024-01-31 PROCEDURE — 99999 PR PBB SHADOW E&M-EST. PATIENT-LVL III: CPT | Mod: PBBFAC,,, | Performed by: ORTHOPAEDIC SURGERY

## 2024-01-31 PROCEDURE — 1159F MED LIST DOCD IN RCRD: CPT | Mod: CPTII,S$GLB,, | Performed by: ORTHOPAEDIC SURGERY

## 2024-01-31 PROCEDURE — 3008F BODY MASS INDEX DOCD: CPT | Mod: CPTII,S$GLB,, | Performed by: ORTHOPAEDIC SURGERY

## 2024-01-31 PROCEDURE — 3074F SYST BP LT 130 MM HG: CPT | Mod: CPTII,S$GLB,, | Performed by: ORTHOPAEDIC SURGERY

## 2024-01-31 NOTE — PROGRESS NOTES
CC: left shoulder pain     58 y.o. Female with left shoulder pain who reports that the pain began 2 months ago. She reports that the pain is located at the anterolateral shoulder that radiates down her arm to the elbow and sometimes the wrist. She reports that the pain is worse with overhead activity. It also bothers her at night and wakes her from sleep. She has pain with internal rotation of the arm. Pain is improved with with NSAIDs and PT (5 sessions) with moderate relief of her pain. Denies any numbness or tingling.     Is affecting ADLs.     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 She is right hand dominant.         Interval:   Feels better, SANE 80  2/10  PT helping    Review of Systems   Constitution: Negative. Negative for chills, fever and night sweats.   HENT: Negative for congestion and headaches.    Eyes: Negative for blurred vision, left vision loss and right vision loss.   Cardiovascular: Negative for chest pain and syncope.   Respiratory: Negative for cough and shortness of breath.    Endocrine: Negative for polydipsia, polyphagia and polyuria.   Hematologic/Lymphatic: Negative for bleeding problem. Does not bruise/bleed easily.   Skin: Negative for dry skin, itching and rash.   Musculoskeletal: Negative for falls and muscle weakness.   Gastrointestinal: Negative for abdominal pain and bowel incontinence.   Genitourinary: Negative for bladder incontinence and nocturia.   Neurological: Negative for disturbances in coordination, loss of balance and seizures.   Psychiatric/Behavioral: Negative for depression. The patient does not have insomnia.    Allergic/Immunologic: Negative for hives and persistent infections.     PAST MEDICAL HISTORY:   Past Medical History:   Diagnosis Date    History of cold sores     Hyperlipidemia 2021    IFG (impaired fasting glucose) 2019    Increased body mass index (BMI)      PAST SURGICAL HISTORY:   Past  "Surgical History:   Procedure Laterality Date    APPENDECTOMY  1979     SECTION       FAMILY HISTORY:   Family History   Problem Relation Age of Onset    Ulcers Mother         Duodenal ulcer    Diabetes type II Father     Colon cancer Neg Hx      SOCIAL HISTORY:   Social History     Socioeconomic History    Marital status: Unknown   Occupational History    Occupation: Pediatric endocrinologist   Tobacco Use    Smoking status: Never    Smokeless tobacco: Never   Substance and Sexual Activity    Alcohol use: Yes     Comment: socially    Drug use: Never    Sexual activity: Yes     Partners: Male       MEDICATIONS:   Current Outpatient Medications:     semaglutide, weight loss, (WEGOVY) 2.4 mg/0.75 mL PnIj, Inject 2.4 mg into the skin every 7 days. (Patient not taking: Reported on 2024), Disp: 3 mL, Rfl: 15    valACYclovir (VALTREX) 1000 MG tablet, Take 1 tablet (1,000 mg total) by mouth every 8 (eight) hours as needed (For cold sores. Take for 7 days.)., Disp: 21 tablet, Rfl: 5  ALLERGIES: Review of patient's allergies indicates:  No Known Allergies    VITAL SIGNS: /82   Pulse 77   Ht 5' 7" (1.702 m)   Wt 77.5 kg (170 lb 13.7 oz)   BMI 26.76 kg/m²      PHYSICAL EXAMINATION:  General:  The patient is alert and oriented x 3.  Mood is pleasant.  Observation of ears, eyes and nose reveal no gross abnormalities.  No labored breathing observed.  Gait is coordinated. Patient can toe walk and heel walk without difficulty.      left SHOULDER / UPPER EXTREMITY EXAM    OBSERVATION:     Swelling  none  Deformity  none   Discoloration  none   Scapular winging none   Scars   none  Atrophy  none    TENDERNESS / CREPITUS (T/C):          T/C      T/C   Clavicle   -/-  SUPRAspinatus    -/-     AC Jt.    -/-  INFRAspinatus  -/-    SC Jt.    -/-  Deltoid    -/-      G. Tuberosity  -/-  LH BICEP groove  +/-   Acromion:  -/-  Midline Neck   -/-     Scapular Spine -/-  Trapezium   -/-   SMA Scapula  -/-  GH jt. " line - post  -/-     Scapulothoracic  -/-         ROM: (* = with pain)  Right shoulder   Left shoulder        AROM (PROM)   AROM (PROM)   FE    170° (175°)     170° (175°)     ER at 0°    60°  (65°)    30   ER at 90° ABD  90°  (90°)    NT   IR at 90°  ABD   NA  (40°)     NT   IR (spine level)   T10     L3    STRENGTH: (* = with pain) RIGHT SHOULDER  LEFT SHOULDER   SCAPTION at 0  5/5    5-/5   SCAPTION at 30  5/5    5/5    IR    5/5    5/5   ER    5/5    5/5   BICEPS   5/5    5/5   Deltoid    5/5    5/5     SIGNS:  Painful side       NEER   Min +   OYANYS  + improved    KNOX   Min +   SPEEDS  +     DROP ARM   neg   BELLY PRESS neg   Superior escape none    LIFT-OFF  neg   X-Body ADD    neg    MOVING VALGUS neg    Bear Hug   +       STABILITY TESTING    RIGHT SHOULDER   LEFT SHOULDER     Translation     Anterior  up face     up face    Posterior  up face    up face    Sulcus   < 10mm    < 10 mm     Signs   Apprehension   neg      neg       Relocation   no change     no change      Jerk test  neg     neg    EXTREMITY NEURO-VASCULAR EXAM    Sensation grossly intact to light touch all dermatomal regions.    DTR 2+ Biceps, Triceps, BR and Negative Sallys sign   Grossly intact motor function at Elbow, Wrist and Hand   Distal pulses radial and ulnar 2+, brisk cap refill, symmetric.      NECK:  Painless FROM and spinous processes non-tender. Negative Spurlings sign.      OTHER FINDINGS:  + scapular dyskinesia    XRAYS reviewed and interpreted personally by me:  Shoulder trauma series left,  were obtained and reviewed  No convincing fracture or dislocation is noted. The osseous structures appear well mineralized and well aligned    MRI reviewed and interpreted personally by me::  Left rotator cuff tear  SLAP           ASSESSMENT:   left:  1. Shoulder rotator cuff tear   2.  Scapular dyskinesia    PLAN:    Cont PT, Discussed with PT.  PT for scapular stabilization: Scapular dyskinesia   Scapular stabilization  - Dorcas protocol, 1-3x/week x 6-8 weeks with HEP    All questions were answered, pt will contact us for questions or concerns in the interim.    I made the decision to obtain old records of the patient including previous notes and imaging. New imaging was ordered today of the extremity or extremities evaluated. I independently reviewed and interpreted the radiographs and/or MRIs today as well as prior imaging.

## 2024-03-15 ENCOUNTER — TELEPHONE (OUTPATIENT)
Dept: INTERNAL MEDICINE | Facility: CLINIC | Age: 59
End: 2024-03-15
Payer: COMMERCIAL

## 2024-03-15 DIAGNOSIS — M25.512 CHRONIC LEFT SHOULDER PAIN: ICD-10-CM

## 2024-03-15 DIAGNOSIS — E78.5 HYPERLIPIDEMIA, UNSPECIFIED HYPERLIPIDEMIA TYPE: ICD-10-CM

## 2024-03-15 DIAGNOSIS — G89.29 CHRONIC LEFT SHOULDER PAIN: ICD-10-CM

## 2024-03-16 ENCOUNTER — LAB VISIT (OUTPATIENT)
Dept: LAB | Facility: HOSPITAL | Age: 59
End: 2024-03-16
Payer: COMMERCIAL

## 2024-03-16 DIAGNOSIS — G89.29 CHRONIC LEFT SHOULDER PAIN: ICD-10-CM

## 2024-03-16 DIAGNOSIS — E78.5 HYPERLIPIDEMIA, UNSPECIFIED HYPERLIPIDEMIA TYPE: ICD-10-CM

## 2024-03-16 DIAGNOSIS — M25.512 CHRONIC LEFT SHOULDER PAIN: ICD-10-CM

## 2024-03-16 LAB
ALBUMIN SERPL BCP-MCNC: 3.8 G/DL (ref 3.5–5.2)
ALP SERPL-CCNC: 67 U/L (ref 55–135)
ALT SERPL W/O P-5'-P-CCNC: 12 U/L (ref 10–44)
ANION GAP SERPL CALC-SCNC: 9 MMOL/L (ref 8–16)
AST SERPL-CCNC: 20 U/L (ref 10–40)
BILIRUB SERPL-MCNC: 0.5 MG/DL (ref 0.1–1)
BUN SERPL-MCNC: 18 MG/DL (ref 6–20)
CALCIUM SERPL-MCNC: 9.7 MG/DL (ref 8.7–10.5)
CHLORIDE SERPL-SCNC: 105 MMOL/L (ref 95–110)
CHOLEST SERPL-MCNC: 241 MG/DL (ref 120–199)
CHOLEST/HDLC SERPL: 3.6 {RATIO} (ref 2–5)
CK SERPL-CCNC: 127 U/L (ref 20–180)
CO2 SERPL-SCNC: 25 MMOL/L (ref 23–29)
CREAT SERPL-MCNC: 0.8 MG/DL (ref 0.5–1.4)
CRP SERPL-MCNC: 6.6 MG/L (ref 0–8.2)
ERYTHROCYTE [SEDIMENTATION RATE] IN BLOOD BY PHOTOMETRIC METHOD: 19 MM/HR (ref 0–36)
EST. GFR  (NO RACE VARIABLE): >60 ML/MIN/1.73 M^2
GLUCOSE SERPL-MCNC: 97 MG/DL (ref 70–110)
HDLC SERPL-MCNC: 67 MG/DL (ref 40–75)
HDLC SERPL: 27.8 % (ref 20–50)
LDLC SERPL CALC-MCNC: 162 MG/DL (ref 63–159)
NONHDLC SERPL-MCNC: 174 MG/DL
POTASSIUM SERPL-SCNC: 4.3 MMOL/L (ref 3.5–5.1)
PROT SERPL-MCNC: 7.1 G/DL (ref 6–8.4)
SODIUM SERPL-SCNC: 139 MMOL/L (ref 136–145)
TRIGL SERPL-MCNC: 60 MG/DL (ref 30–150)

## 2024-03-16 PROCEDURE — 86140 C-REACTIVE PROTEIN: CPT | Performed by: INTERNAL MEDICINE

## 2024-03-16 PROCEDURE — 80061 LIPID PANEL: CPT | Performed by: INTERNAL MEDICINE

## 2024-03-16 PROCEDURE — 80053 COMPREHEN METABOLIC PANEL: CPT | Performed by: INTERNAL MEDICINE

## 2024-03-16 PROCEDURE — 36415 COLL VENOUS BLD VENIPUNCTURE: CPT | Performed by: INTERNAL MEDICINE

## 2024-03-16 PROCEDURE — 85652 RBC SED RATE AUTOMATED: CPT | Performed by: INTERNAL MEDICINE

## 2024-03-16 PROCEDURE — 82550 ASSAY OF CK (CPK): CPT | Performed by: INTERNAL MEDICINE

## 2024-03-18 ENCOUNTER — PATIENT MESSAGE (OUTPATIENT)
Dept: INTERNAL MEDICINE | Facility: CLINIC | Age: 59
End: 2024-03-18
Payer: COMMERCIAL

## 2024-03-18 NOTE — TELEPHONE ENCOUNTER
LDL mildly elevated still with otherwise normal labs. Pt focusing on lifestyle previously. Statin remains an option if pt desires. Another option would be to check CT calcium score to further inform decision making.

## 2024-05-02 DIAGNOSIS — R73.01 IFG (IMPAIRED FASTING GLUCOSE): ICD-10-CM

## 2024-05-02 DIAGNOSIS — E88.819 INSULIN RESISTANCE: ICD-10-CM

## 2024-05-02 RX ORDER — SEMAGLUTIDE 2.4 MG/.75ML
2.4 INJECTION, SOLUTION SUBCUTANEOUS
Qty: 3 ML | Refills: 5 | Status: SHIPPED | OUTPATIENT
Start: 2024-05-02 | End: 2024-06-14 | Stop reason: SDUPTHER

## 2024-06-14 ENCOUNTER — PATIENT MESSAGE (OUTPATIENT)
Dept: INTERNAL MEDICINE | Facility: CLINIC | Age: 59
End: 2024-06-14

## 2024-06-14 ENCOUNTER — OFFICE VISIT (OUTPATIENT)
Dept: INTERNAL MEDICINE | Facility: CLINIC | Age: 59
End: 2024-06-14
Payer: COMMERCIAL

## 2024-06-14 DIAGNOSIS — E66.9 OBESITY, UNSPECIFIED CLASSIFICATION, UNSPECIFIED OBESITY TYPE, UNSPECIFIED WHETHER SERIOUS COMORBIDITY PRESENT: Primary | ICD-10-CM

## 2024-06-14 PROCEDURE — 99499 UNLISTED E&M SERVICE: CPT | Mod: 95,,,

## 2024-06-14 RX ORDER — SEMAGLUTIDE 2.4 MG/.75ML
2.4 INJECTION, SOLUTION SUBCUTANEOUS
Qty: 3 ML | Refills: 5 | Status: ACTIVE | OUTPATIENT
Start: 2024-06-14

## 2024-06-14 NOTE — PROGRESS NOTES
Patient ID: Celi Bermeo is a 59 y.o. female    Subjective  Chief Complaint: patient presents for medical weight loss management.    Contraindications to GLP-1 receptor agonist therapy:   No personal or family history of MEN or MTC, history of allergic reaction while taking a GLP-1 receptor agonist, and history of pancreatitis while taking a GLP-1 receptor agonist       History of weight loss therapy:  Reports has been on semaglutide therapy for over 1 year now. Currently on 2.4 mg.     Weight loss history:  Reports has lost about 20 kg since starting therapy  Starting weight: 223 lbs - pt reported  Current weight:    6/10/2024   Recent Readings    Weight (lbs) 179 lb    Weight (lbs) 179 lb    BMI 28.03 BMI    BMI 28.03 BMI        % weight loss since GLP-1 initiation: 19.7%    Tolerance to current therapy:  Denies nausea, vomiting, diarrhea, constipation, abdominal pain    Objective  Lab Results   Component Value Date     03/16/2024     10/10/2023     11/30/2022     Lab Results   Component Value Date    K 4.3 03/16/2024    K 4.4 10/10/2023    K 4.0 11/30/2022     Lab Results   Component Value Date     03/16/2024     10/10/2023     11/30/2022     Lab Results   Component Value Date    CO2 25 03/16/2024    CO2 24 10/10/2023    CO2 26 11/30/2022     Lab Results   Component Value Date    BUN 18 03/16/2024    BUN 17 10/10/2023    BUN 15 11/30/2022     Lab Results   Component Value Date    GLU 97 03/16/2024    GLU 86 10/10/2023    GLU 92 11/30/2022     Lab Results   Component Value Date    CALCIUM 9.7 03/16/2024    CALCIUM 9.8 10/10/2023    CALCIUM 9.4 11/30/2022     Lab Results   Component Value Date    PROT 7.1 03/16/2024    PROT 7.7 10/10/2023    PROT 7.4 11/30/2022     Lab Results   Component Value Date    ALBUMIN 3.8 03/16/2024    ALBUMIN 4.1 10/10/2023    ALBUMIN 3.9 11/30/2022     Lab Results   Component Value Date    BILITOT 0.5 03/16/2024    BILITOT 0.6 10/10/2023     BILITOT 0.6 11/30/2022     Lab Results   Component Value Date    AST 20 03/16/2024    AST 18 10/10/2023    AST 19 11/30/2022     Lab Results   Component Value Date    ALT 12 03/16/2024    ALT 10 10/10/2023    ALT 12 11/30/2022     Lab Results   Component Value Date    ANIONGAP 9 03/16/2024    ANIONGAP 11 10/10/2023    ANIONGAP 11 11/30/2022     Lab Results   Component Value Date    CREATININE 0.8 03/16/2024    CREATININE 0.9 10/10/2023    CREATININE 0.9 11/30/2022     Lab Results   Component Value Date    EGFRNORACEVR >60.0 03/16/2024    EGFRNORACEVR >60.0 10/10/2023    EGFRNORACEVR >60.0 11/30/2022         Assessment/Plan  -Continuation of GLP-1 therapy  -Continue Wegovy 2.4 mg once weekly  -RTC in 6 months       Patient consented to pharmacist management via collaborative practice.

## 2024-06-14 NOTE — PATIENT INSTRUCTIONS
WEGOVY® (wee-DEMETRIA-vee), (semaglutide) injection, for subcutaneous use  Read this Medication Guide and Instructions for Use before you start using WEGOVY and each time you get a refill. There may be new information. This information does not take the place of talking to your healthcare provider about your medical condition or your treatment.  What is the most important information I should know about WEGOVY?   WEGOVY may cause serious side effects, including:   Possible thyroid tumors, including cancer. Tell your healthcare provider if you get a lump or swelling in your neck, hoarseness, trouble swallowing, or shortness of breath. These may be symptoms of thyroid cancer. In studies with rodents, WEGOVY and medicines that work like WEGOVY caused thyroid tumors, including thyroid cancer. It is not known if WEGOVY will cause thyroid tumors or a type of thyroid cancer called medullary thyroid carcinoma (MTC) in people.   Do not use WEGOVY if you or any of your family have ever had a type of thyroid cancer called medullary thyroid carcinoma (MTC), or if you have an endocrine system condition called Multiple Endocrine Neoplasia syndrome type 2 (MEN 2).  What is WEGOVY?   WEGOVY is an injectable prescription medicine used with a reduced calorie diet and increased physical activity:   to reduce the risk of major cardiovascular events such as death, heart attack, or stroke in adults with known heart disease and with either obesity or overweight.   that may help adults and children aged 12 years and older with obesity, or some adults with overweight who also have weight-related medical problems, to help them lose excess body weight and keep the weight off.   WEGOVY contains semaglutide and should not be used with other semaglutide-containing products or other GLP-1 receptor agonist medicines.  It is not known if WEGOVY is safe and effective for use in children under 12 years of age.  Do not use WEGOVY if:   you or any of your  family have ever had a type of thyroid cancer called medullary thyroid carcinoma (MTC) or if you have an endocrine system condition called Multiple Endocrine Neoplasia syndrome type 2 (MEN 2).   you have had a serious allergic reaction to semaglutide or any of the ingredients in WEGOVY. See the end of this Medication Guide for a complete list of ingredients in WEGOVY. Symptoms of a serious allergic reaction include:   swelling of your face, lips, tongue or throat   fainting or feeling dizzy   problems breathing or swallowing   very rapid heartbeat   severe rash or itching  Before using WEGOVY, tell your healthcare provider if you have any other medical conditions, including if you:   have or have had problems with your pancreas or kidneys.   have type 2 diabetes and a history of diabetic retinopathy.   have or have had depression or suicidal thoughts, or mental health issues.   are pregnant or plan to become pregnant. WEGOVY may harm your unborn baby. You should stop using WEGOVY 2 months before you plan to become pregnant.   Pregnancy Exposure Registry: There is a pregnancy exposure registry for women who use WEGOVY during pregnancy. The purpose of this registry is to collect information about the health of you and your baby. Talk to your healthcare provider about how you can take part in this registry or you may contact [employer] at 1-577.586.4740.   are breastfeeding or plan to breastfeed. It is not known if WEGOVY passes into your breast milk. You should talk with your healthcare provider about the best way to feed your baby while using WEGOVY.   Tell your healthcare provider about all the medicines you take, including prescription and over-the-counter medicines, vitamins, and herbal supplements. WEGOVY may affect the way some medicines work and some medicines may affect the way WEGOVY works. Tell your healthcare provider if you are taking other medicines to treat diabetes, including sulfonylureas or insulin.  WEGOVY slows stomach emptying and can affect medicines that need to pass through the stomach quickly.   Know the medicines you take. Keep a list of them to show your healthcare provider and pharmacist when you get a new medicine.  How should I use WEGOVY?   Read the Instructions for Use that comes with WEGOVY.   Use WEGOVY exactly as your healthcare provider tells you to.   Your healthcare provider should show you how to use WEGOVY before you use it for the first time.   WEGOVY is injected under the skin (subcutaneously) of your stomach (abdomen), thigh, or upper arm. Do not inject WEGOVY into a muscle (intramuscularly) or vein (intravenously).   Change (rotate) your injection site with each injection. Do not use the same site for each injection.   Use WEGOVY 1 time each week, on the same day each week, at any time of the day.   Start WEGOVY with 0.25 mg per week in your first month. In your second month, increase your weekly dose to 0.5 mg. In the third month, increase your weekly dose to 1 mg. In the fourth month, increase your weekly dose to 1.7 mg. In the fifth month onwards, your healthcare provider may either maintain your dose at 1.7 mg weekly or increase your weekly dose to 2.4 mg.   If you need to change the day of the week, you may do so as long as your last dose of WEGOVY was given 2 or more days before.   If you miss a dose of WEGOVY and the next scheduled dose is more than 2 days away (48 hours), take the missed dose as soon as possible. If you miss a dose of WEGOVY and the next schedule dose is less than 2 days away (48 hours), do not administer the dose. Take your next dose on the regularly scheduled day.   If you miss doses of WEGOVY for more than 2 weeks, take your next dose on the regularly scheduled day or call your healthcare provider to talk about how to restart your treatment.   You can take WEGOVY with or without food.   If you take too much WEGOVY, you may have severe nausea, severe vomiting  and severe low blood sugar. Call your healthcare provider or go to the nearest hospital emergency room right away if you experience any of these symptoms.  What are the possible side effects of WEGOVY?   WEGOVY may cause serious side effects, including:   See What is the most important information I should know about WEGOVY?   inflammation of your pancreas (pancreatitis). Stop using WEGOVY and call your healthcare provider right away if you have severe pain in your stomach area (abdomen) that will not go away, with or without vomiting. You may feel the pain from your abdomen to your back.   gallbladder problems. WEGOVY may cause gallbladder problems including gallstones. Some gallbladder problems need surgery. Call your healthcare provider if you have any of the following symptoms:   pain in your upper stomach (abdomen)   yellowing of skin or eyes (jaundice)   fever   kelley-colored stools   increased risk of low blood sugar (hypoglycemia), especially those who also take medicines to treat diabetes mellitus such as insulin or sulfonylureas. Low blood sugar in patients with diabetes who receive WEGOVY can be a serious side effect. Talk to your healthcare provider about how to recognize and treat low blood sugar. You should check your blood sugar before you start taking WEGOVY and while you take WEGOVY. Signs and symptoms of low blood sugar may include:   dizziness or light-headedness   sweating   shakiness   blurred vision   slurred speech   weakness   anxiety   hunger   headache   irritability or mood changes   confusion or drowsiness   fast heartbeat   feeling jittery  kidney problems (kidney failure). In people who have kidney problems, diarrhea, nausea, and vomiting may cause a loss of fluids (dehydration) which may cause kidney problems to get worse. It is important for you to drink fluids to help reduce your chance of dehydration.   serious allergic reactions. Stop using WEGOVY and get medical help right away,  if you have any symptoms of a serious allergic reaction including:   swelling of your face, lips, tongue   severe rash or itching o very rapid heartbeat or throat   problems breathing or swallowing fainting or feeling dizzy   change in vision in people with type 2 diabetes. Tell your healthcare provider if you have changes in vision during treatment with WEGOVY.   increased heart rate. WEGOVY can increase your heart rate while you are at rest. Your healthcare provider should check your heart rate while you take WEGOVY. Tell your healthcare provider if you feel your heart racing or pounding in your chest and it lasts for several minutes.   depression or thoughts of suicide. You should pay attention to any mental changes, especially sudden changes in your mood, behaviors, thoughts, or feelings. Call your healthcare provider right away if you have any mental changes that are new, worse, or worry you.   The most common side effects of WEGOVY in adults or children aged 12 years and older may include:  nausea   stomach (abdomen) pain   dizziness   gas   diarrhea   headache   feeling bloated   stomach flu   vomiting   tiredness (fatigue)   belching   heartburn   constipation   upset stomach   low blood sugar in people   runny nose or sore throat with type 2 diabetes   Talk to your healthcare provider about any side effect that bothers you or does not go away. These are not all the possible side effects of WEGOVY. Call your doctor for medical advice about side effects. You may report side effects to FDA at 4-234-FDA-6260.  General information about the safe and effective use of WEGOVY.  Medicines are sometimes prescribed for purposes other than those listed in a Medication Guide. Do not use WEGOVY for a condition for which it was not prescribed. Do not give WEGOVY to other people, even if they have the same symptoms that you have. It may harm them. You can ask your pharmacist or healthcare provider for information about  WEGOVY that is written for health professionals.  What are the ingredients in WEGOVY?   Active Ingredient: semaglutide   Inactive Ingredients: disodium phosphate dihydrate, 1.42 mg; sodium chloride, 8.25 mg; water for injection; and hydrochloric acid or sodium hydroxide may be added to adjust pH.  For more information, go to [employer] or call [phone]-.

## 2024-07-08 ENCOUNTER — PATIENT MESSAGE (OUTPATIENT)
Dept: ADMINISTRATIVE | Facility: OTHER | Age: 59
End: 2024-07-08
Payer: COMMERCIAL

## 2024-09-10 ENCOUNTER — PATIENT MESSAGE (OUTPATIENT)
Dept: INTERNAL MEDICINE | Facility: CLINIC | Age: 59
End: 2024-09-10
Payer: COMMERCIAL

## 2024-09-10 DIAGNOSIS — E66.9 OBESITY, UNSPECIFIED CLASSIFICATION, UNSPECIFIED OBESITY TYPE, UNSPECIFIED WHETHER SERIOUS COMORBIDITY PRESENT: ICD-10-CM

## 2024-09-19 NOTE — PROGRESS NOTES
Patient ID: Celi Bermeo is a 59 y.o. female    Subjective  Chief Complaint: patient presents for medical weight loss management.    Co-morbidities: none    HPI: Patient [facility] June 2024 and is currently managed on Wegovy 2.4 mg. Pt was on therapy for >1 year when initiating care with [facility].    Tolerance to current therapy:  Denies nausea, vomiting, diarrhea, constipation, abdominal pain    Weight loss history:  Starting weight: 223 lbs - pt reported  Current weight:    9/9/2024   Recent Readings    Weight (lbs) 174 lb    BMI 27.25 BMI    % weight loss since GLP-1 initiation: 22.0 %    Objective  Lab Results   Component Value Date     03/16/2024     10/10/2023     11/30/2022     Lab Results   Component Value Date    K 4.3 03/16/2024    K 4.4 10/10/2023    K 4.0 11/30/2022     Lab Results   Component Value Date     03/16/2024     10/10/2023     11/30/2022     Lab Results   Component Value Date    CO2 25 03/16/2024    CO2 24 10/10/2023    CO2 26 11/30/2022     Lab Results   Component Value Date    BUN 18 03/16/2024    BUN 17 10/10/2023    BUN 15 11/30/2022     Lab Results   Component Value Date    GLU 97 03/16/2024    GLU 86 10/10/2023    GLU 92 11/30/2022     Lab Results   Component Value Date    CALCIUM 9.7 03/16/2024    CALCIUM 9.8 10/10/2023    CALCIUM 9.4 11/30/2022     Lab Results   Component Value Date    PROT 7.1 03/16/2024    PROT 7.7 10/10/2023    PROT 7.4 11/30/2022     Lab Results   Component Value Date    ALBUMIN 3.8 03/16/2024    ALBUMIN 4.1 10/10/2023    ALBUMIN 3.9 11/30/2022     Lab Results   Component Value Date    BILITOT 0.5 03/16/2024    BILITOT 0.6 10/10/2023    BILITOT 0.6 11/30/2022     Lab Results   Component Value Date    AST 20 03/16/2024    AST 18 10/10/2023    AST 19 11/30/2022     Lab Results   Component Value Date    ALT 12 03/16/2024    ALT 10 10/10/2023    ALT 12 11/30/2022     Lab  Results   Component Value Date    ANIONGAP 9 03/16/2024    ANIONGAP 11 10/10/2023    ANIONGAP 11 11/30/2022     Lab Results   Component Value Date    CREATININE 0.8 03/16/2024    CREATININE 0.9 10/10/2023    CREATININE 0.9 11/30/2022     Lab Results   Component Value Date    EGFRNORACEVR >60.0 03/16/2024    EGFRNORACEVR >60.0 10/10/2023    EGFRNORACEVR >60.0 11/30/2022     Assessment/Plan  -Continuation of GLP-1 therapy  -Continue Wegovy 2.4 mg once weekly  -RTC in 6 months     Patient consented to pharmacist management via collaborative practice.

## 2024-09-20 ENCOUNTER — PATIENT MESSAGE (OUTPATIENT)
Dept: INTERNAL MEDICINE | Facility: CLINIC | Age: 59
End: 2024-09-20

## 2024-09-20 ENCOUNTER — OFFICE VISIT (OUTPATIENT)
Dept: INTERNAL MEDICINE | Facility: CLINIC | Age: 59
End: 2024-09-20
Payer: COMMERCIAL

## 2024-09-20 DIAGNOSIS — E66.9 OBESITY, UNSPECIFIED CLASSIFICATION, UNSPECIFIED OBESITY TYPE, UNSPECIFIED WHETHER SERIOUS COMORBIDITY PRESENT: Primary | ICD-10-CM

## 2024-09-20 RX ORDER — SEMAGLUTIDE 2.4 MG/.75ML
2.4 INJECTION, SOLUTION SUBCUTANEOUS
Qty: 3 ML | Refills: 5 | Status: SHIPPED | OUTPATIENT
Start: 2024-09-20

## 2024-10-23 ENCOUNTER — HOSPITAL ENCOUNTER (OUTPATIENT)
Dept: RADIOLOGY | Facility: HOSPITAL | Age: 59
Discharge: HOME | End: 2024-10-23
Attending: INTERNAL MEDICINE
Payer: COMMERCIAL

## 2024-10-23 DIAGNOSIS — Z12.31 ENCOUNTER FOR SCREENING MAMMOGRAM FOR BREAST CANCER: ICD-10-CM

## 2024-10-23 PROCEDURE — 77063 BREAST TOMOSYNTHESIS BI: CPT | Mod: 26,,, | Performed by: RADIOLOGY

## 2024-10-23 PROCEDURE — 77067 SCR MAMMO BI INCL CAD: CPT | Mod: 26,,, | Performed by: RADIOLOGY

## 2024-10-23 PROCEDURE — 77063 BREAST TOMOSYNTHESIS BI: CPT | Mod: TC

## 2024-11-14 ENCOUNTER — PATIENT MESSAGE (OUTPATIENT)
Dept: ADMINISTRATIVE | Facility: OTHER | Age: 59
End: 2024-11-14
Payer: COMMERCIAL

## 2024-12-23 ENCOUNTER — CLINICAL SUPPORT (OUTPATIENT)
Dept: INTERNAL MEDICINE | Facility: CLINIC | Age: 59
End: 2024-12-23
Payer: COMMERCIAL

## 2024-12-23 ENCOUNTER — OFFICE VISIT (OUTPATIENT)
Dept: INTERNAL MEDICINE | Facility: CLINIC | Age: 59
End: 2024-12-23
Payer: COMMERCIAL

## 2024-12-23 VITALS
HEIGHT: 67 IN | HEART RATE: 81 BPM | WEIGHT: 154 LBS | SYSTOLIC BLOOD PRESSURE: 111 MMHG | BODY MASS INDEX: 24.17 KG/M2 | DIASTOLIC BLOOD PRESSURE: 73 MMHG

## 2024-12-23 DIAGNOSIS — Z12.11 COLON CANCER SCREENING: ICD-10-CM

## 2024-12-23 DIAGNOSIS — Z00.00 ROUTINE GENERAL MEDICAL EXAMINATION AT A HEALTH CARE FACILITY: Primary | ICD-10-CM

## 2024-12-23 DIAGNOSIS — Z78.0 POSTMENOPAUSAL ESTROGEN DEFICIENCY: ICD-10-CM

## 2024-12-23 DIAGNOSIS — Z00.00 ENCOUNTER FOR ANNUAL HEALTH EXAMINATION: Primary | ICD-10-CM

## 2024-12-23 LAB
ALBUMIN SERPL BCP-MCNC: 3.5 G/DL (ref 3.5–5.2)
ALP SERPL-CCNC: 66 U/L (ref 40–150)
ALT SERPL W/O P-5'-P-CCNC: 14 U/L (ref 10–44)
ANION GAP SERPL CALC-SCNC: 8 MMOL/L (ref 8–16)
AST SERPL-CCNC: 20 U/L (ref 10–40)
BILIRUB SERPL-MCNC: 0.5 MG/DL (ref 0.1–1)
BUN SERPL-MCNC: 14 MG/DL (ref 6–20)
CALCIUM SERPL-MCNC: 9 MG/DL (ref 8.7–10.5)
CHLORIDE SERPL-SCNC: 110 MMOL/L (ref 95–110)
CHOLEST SERPL-MCNC: 170 MG/DL (ref 120–199)
CHOLEST/HDLC SERPL: 3.1 {RATIO} (ref 2–5)
CO2 SERPL-SCNC: 21 MMOL/L (ref 23–29)
CREAT SERPL-MCNC: 0.9 MG/DL (ref 0.5–1.4)
ERYTHROCYTE [DISTWIDTH] IN BLOOD BY AUTOMATED COUNT: 13.2 % (ref 11.5–14.5)
EST. GFR  (NO RACE VARIABLE): >60 ML/MIN/1.73 M^2
ESTIMATED AVG GLUCOSE: 97 MG/DL (ref 68–131)
FERRITIN SERPL-MCNC: 187 NG/ML (ref 20–300)
FOLATE SERPL-MCNC: 5.2 NG/ML (ref 4–24)
GLUCOSE SERPL-MCNC: 94 MG/DL (ref 70–110)
HBA1C MFR BLD: 5 % (ref 4–5.6)
HCT VFR BLD AUTO: 34.4 % (ref 37–48.5)
HDLC SERPL-MCNC: 55 MG/DL (ref 40–75)
HDLC SERPL: 32.4 % (ref 20–50)
HGB BLD-MCNC: 11.3 G/DL (ref 12–16)
IRON SERPL-MCNC: 67 UG/DL (ref 30–160)
LDLC SERPL CALC-MCNC: 105.2 MG/DL (ref 63–159)
MCH RBC QN AUTO: 30.7 PG (ref 27–31)
MCHC RBC AUTO-ENTMCNC: 32.8 G/DL (ref 32–36)
MCV RBC AUTO: 94 FL (ref 82–98)
NONHDLC SERPL-MCNC: 115 MG/DL
PLATELET # BLD AUTO: 247 K/UL (ref 150–450)
PMV BLD AUTO: 11.2 FL (ref 9.2–12.9)
POTASSIUM SERPL-SCNC: 4.1 MMOL/L (ref 3.5–5.1)
PROT SERPL-MCNC: 6.6 G/DL (ref 6–8.4)
RBC # BLD AUTO: 3.68 M/UL (ref 4–5.4)
SATURATED IRON: 25 % (ref 20–50)
SODIUM SERPL-SCNC: 139 MMOL/L (ref 136–145)
TOTAL IRON BINDING CAPACITY: 269 UG/DL (ref 250–450)
TRANSFERRIN SERPL-MCNC: 182 MG/DL (ref 200–375)
TRIGL SERPL-MCNC: 49 MG/DL (ref 30–150)
TSH SERPL DL<=0.005 MIU/L-ACNC: 1.21 UIU/ML (ref 0.4–4)
VIT B12 SERPL-MCNC: 439 PG/ML (ref 210–950)
WBC # BLD AUTO: 5.44 K/UL (ref 3.9–12.7)

## 2024-12-23 PROCEDURE — 80053 COMPREHEN METABOLIC PANEL: CPT | Performed by: INTERNAL MEDICINE

## 2024-12-23 PROCEDURE — 84466 ASSAY OF TRANSFERRIN: CPT | Performed by: INTERNAL MEDICINE

## 2024-12-23 PROCEDURE — 82607 VITAMIN B-12: CPT | Performed by: INTERNAL MEDICINE

## 2024-12-23 PROCEDURE — 3074F SYST BP LT 130 MM HG: CPT | Mod: CPTII,S$GLB,, | Performed by: INTERNAL MEDICINE

## 2024-12-23 PROCEDURE — 85027 COMPLETE CBC AUTOMATED: CPT | Performed by: INTERNAL MEDICINE

## 2024-12-23 PROCEDURE — 84443 ASSAY THYROID STIM HORMONE: CPT | Performed by: INTERNAL MEDICINE

## 2024-12-23 PROCEDURE — 80061 LIPID PANEL: CPT | Performed by: INTERNAL MEDICINE

## 2024-12-23 PROCEDURE — 82728 ASSAY OF FERRITIN: CPT | Performed by: INTERNAL MEDICINE

## 2024-12-23 PROCEDURE — 3078F DIAST BP <80 MM HG: CPT | Mod: CPTII,S$GLB,, | Performed by: INTERNAL MEDICINE

## 2024-12-23 PROCEDURE — 99999 PR PBB SHADOW E&M-EST. PATIENT-LVL I: CPT | Mod: PBBFAC,,,

## 2024-12-23 PROCEDURE — 3044F HG A1C LEVEL LT 7.0%: CPT | Mod: CPTII,S$GLB,, | Performed by: INTERNAL MEDICINE

## 2024-12-23 PROCEDURE — 1159F MED LIST DOCD IN RCRD: CPT | Mod: CPTII,S$GLB,, | Performed by: INTERNAL MEDICINE

## 2024-12-23 PROCEDURE — 83036 HEMOGLOBIN GLYCOSYLATED A1C: CPT | Performed by: INTERNAL MEDICINE

## 2024-12-23 PROCEDURE — 3008F BODY MASS INDEX DOCD: CPT | Mod: CPTII,S$GLB,, | Performed by: INTERNAL MEDICINE

## 2024-12-23 PROCEDURE — 99999 PR PBB SHADOW E&M-EST. PATIENT-LVL III: CPT | Mod: PBBFAC,,, | Performed by: INTERNAL MEDICINE

## 2024-12-23 PROCEDURE — 82746 ASSAY OF FOLIC ACID SERUM: CPT | Performed by: INTERNAL MEDICINE

## 2024-12-23 PROCEDURE — 99396 PREV VISIT EST AGE 40-64: CPT | Mod: S$GLB,,, | Performed by: INTERNAL MEDICINE

## 2024-12-24 ENCOUNTER — HOSPITAL ENCOUNTER (OUTPATIENT)
Dept: RADIOLOGY | Facility: CLINIC | Age: 59
Discharge: HOME | End: 2024-12-24
Attending: INTERNAL MEDICINE
Payer: COMMERCIAL

## 2024-12-24 DIAGNOSIS — Z78.0 POSTMENOPAUSAL ESTROGEN DEFICIENCY: ICD-10-CM

## 2024-12-24 PROCEDURE — 77080 DXA BONE DENSITY AXIAL: CPT | Mod: TC

## 2024-12-24 NOTE — PROGRESS NOTES
Subjective:       Patient ID: Celi Bermeo is a 59 y.o. female.    Chief Complaint: [employer] (Has been well)      HPI  Annual health exam. Reviewed medical, surgical, social and family history, medications, appropriate preventive health screenings, as well as vaccination history. Updates as noted below or in assessment and plan.    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 Generally well. Injects 2.4 mg Wegovy over other wk now for maintenance of wt loss. No regular med use otherwise.    Review of Systems   All other systems reviewed and are negative.      Past Medical History:   Diagnosis Date    History of cold sores     Hyperlipidemia     IFG (impaired fasting glucose)     Increased body mass index (BMI)          Current Outpatient Medications:     semaglutide, weight loss, (WEGOVY) 2.4 mg/0.75 mL PnIj, Inject 2.4 mg into the skin every 7 days., Disp: 3 mL, Rfl: 5    valACYclovir (VALTREX) 1000 MG tablet, Take 1 tablet (1,000 mg total) by mouth every 8 (eight) hours as needed (For cold sores. Take for 7 days.)., Disp: 21 tablet, Rfl: 5    Past Surgical History:   Procedure Laterality Date    APPENDECTOMY       SECTION         Family History   Problem Relation Name Age of Onset    Ulcers Mother          Duodenal ulcer    Diabetes type II Father      Stroke Father      Colon cancer Neg Hx         Social History     Tobacco Use    Smoking status: Never    Smokeless tobacco: Never   Substance Use Topics    Alcohol use: Yes     Comment: socially    Drug use: Never       Immunization History   Administered Date(s) Administered    COVID-19, MRNA, LN-S, PF (Pfizer) (Purple Cap) 2020, 2021, 2022    Hepatitis B (recombinant) Adjuvanted, 2 dose 2019, 2019    Influenza (FLUAD) - Quadrivalent - Adjuvanted - PF *Preferred* (65+) 2021, 10/17/2022    Influenza - Quadrivalent - PF *Preferred* (6 months and older) 2019, 2020    Tdap  07/15/2022    Zoster Recombinant 11/30/2022, 10/10/2023         Objective:      Vitals:    12/23/24 1325   BP: 111/73   Pulse: 81       Physical Exam  Constitutional:       General: She is not in acute distress.     Appearance: Normal appearance. She is well-developed. She is not ill-appearing.   HENT:      Head: Normocephalic and atraumatic.      Right Ear: Hearing and tympanic membrane normal. There is no impacted cerumen.      Left Ear: Hearing and tympanic membrane normal. There is no impacted cerumen.      Nose: Nose normal.      Mouth/Throat:      Mouth: Mucous membranes are moist.      Pharynx: Oropharynx is clear.   Eyes:      Extraocular Movements: Extraocular movements intact.      Conjunctiva/sclera: Conjunctivae normal.      Pupils: Pupils are equal, round, and reactive to light.   Neck:      Vascular: No carotid bruit.   Cardiovascular:      Rate and Rhythm: Normal rate and regular rhythm.      Heart sounds: Normal heart sounds. No murmur heard.  Pulmonary:      Effort: Pulmonary effort is normal. No respiratory distress.      Breath sounds: Normal breath sounds. No wheezing, rhonchi or rales.   Abdominal:      General: Abdomen is flat. There is no distension.      Palpations: Abdomen is soft. There is no mass.      Tenderness: There is no abdominal tenderness.      Hernia: No hernia is present.   Musculoskeletal:         General: No swelling or deformity. Normal range of motion.      Cervical back: No tenderness.      Right lower leg: No edema.      Left lower leg: No edema.   Lymphadenopathy:      Cervical: No cervical adenopathy.   Skin:     General: Skin is warm and dry.      Findings: No lesion or rash.   Neurological:      General: No focal deficit present.      Mental Status: She is alert and oriented to person, place, and time.      Cranial Nerves: No cranial nerve deficit.      Coordination: Coordination normal.      Gait: Gait normal.      Deep Tendon Reflexes: Reflexes normal.   Psychiatric:          Mood and Affect: Mood normal.         Behavior: Behavior normal.         Thought Content: Thought content normal.         Judgment: Judgment normal.         Recent Results (from the past 12 weeks)   Comprehensive metabolic panel    Collection Time: 12/23/24  7:54 AM   Result Value Ref Range    Sodium 139 136 - 145 mmol/L    Potassium 4.1 3.5 - 5.1 mmol/L    Chloride 110 95 - 110 mmol/L    CO2 21 (L) 23 - 29 mmol/L    Glucose 94 70 - 110 mg/dL    BUN 14 6 - 20 mg/dL    Creatinine 0.9 0.5 - 1.4 mg/dL    Calcium 9.0 8.7 - 10.5 mg/dL    Total Protein 6.6 6.0 - 8.4 g/dL    Albumin 3.5 3.5 - 5.2 g/dL    Total Bilirubin 0.5 0.1 - 1.0 mg/dL    Alkaline Phosphatase 66 40 - 150 U/L    AST 20 10 - 40 U/L    ALT 14 10 - 44 U/L    eGFR >60.0 >60 mL/min/1.73 m^2    Anion Gap 8 8 - 16 mmol/L   CBC Without Differential    Collection Time: 12/23/24  7:54 AM   Result Value Ref Range    WBC 5.44 3.90 - 12.70 K/uL    RBC 3.68 (L) 4.00 - 5.40 M/uL    Hemoglobin 11.3 (L) 12.0 - 16.0 g/dL    Hematocrit 34.4 (L) 37.0 - 48.5 %    MCV 94 82 - 98 fL    MCH 30.7 27.0 - 31.0 pg    MCHC 32.8 32.0 - 36.0 g/dL    RDW 13.2 11.5 - 14.5 %    Platelets 247 150 - 450 K/uL    MPV 11.2 9.2 - 12.9 fL   Lipid panel    Collection Time: 12/23/24  7:54 AM   Result Value Ref Range    Cholesterol 170 120 - 199 mg/dL    Triglycerides 49 30 - 150 mg/dL    HDL 55 40 - 75 mg/dL    LDL Cholesterol 105.2 63.0 - 159.0 mg/dL    HDL/Cholesterol Ratio 32.4 20.0 - 50.0 %    Total Cholesterol/HDL Ratio 3.1 2.0 - 5.0    Non-HDL Cholesterol 115 mg/dL   TSH    Collection Time: 12/23/24  7:54 AM   Result Value Ref Range    TSH 1.207 0.400 - 4.000 uIU/mL   Hemoglobin A1c    Collection Time: 12/23/24  7:54 AM   Result Value Ref Range    Hemoglobin A1C 5.0 4.0 - 5.6 %    Estimated Avg Glucose 97 68 - 131 mg/dL   Iron and TIBC    Collection Time: 12/23/24  7:54 AM   Result Value Ref Range    Iron 67 30 - 160 ug/dL    Transferrin 182 (L) 200 - 375 mg/dL    TIBC 269 250 - 450  ug/dL    Saturated Iron 25 20 - 50 %   Ferritin    Collection Time: 12/23/24  7:54 AM   Result Value Ref Range    Ferritin 187 20.0 - 300.0 ng/mL   Vitamin B12    Collection Time: 12/23/24  7:54 AM   Result Value Ref Range    Vitamin B-12 439 210 - 950 pg/mL   Folate    Collection Time: 12/23/24  7:54 AM   Result Value Ref Range    Folate 5.2 4.0 - 24.0 ng/mL          Assessment/Plan:     1) Annual wellness exam  2) Hyperlipidemia - Controlled with wt loss and diet. Repeat lipids 1 yr.    - Vaccines reviewed.  - Agreed on repeat Cologuard for colon screening.  - Negative screening mammogram this yr.  - PAP negative 2022.  - Blood pressure normal.  - Glucose screening normal. On Wegovy for wt. Repeat screening 1 yr.  - Postmenopausal since her 40's. Agreed on screening DXA this yr.

## 2025-01-06 ENCOUNTER — TELEPHONE (OUTPATIENT)
Dept: INTERNAL MEDICINE | Facility: CLINIC | Age: 60
End: 2025-01-06
Payer: COMMERCIAL

## 2025-02-17 ENCOUNTER — OFFICE VISIT (OUTPATIENT)
Dept: INTERNAL MEDICINE | Facility: CLINIC | Age: 60
End: 2025-02-17
Payer: COMMERCIAL

## 2025-02-17 ENCOUNTER — PATIENT MESSAGE (OUTPATIENT)
Dept: INTERNAL MEDICINE | Facility: CLINIC | Age: 60
End: 2025-02-17

## 2025-02-17 RX ORDER — SEMAGLUTIDE 2.4 MG/.75ML
2.4 INJECTION, SOLUTION SUBCUTANEOUS
Qty: 3 ML | Refills: 5 | Status: ACTIVE | OUTPATIENT
Start: 2025-02-17

## 2025-02-17 NOTE — PROGRESS NOTES
Patient ID: Celi Bermeo is a 59 y.o. female    Subjective  Chief Complaint: patient presents for medical weight loss management.    Co-morbidities: none    HPI: Patient [facility]24 and is currently managed on Wegovy 2.4 mg. Pt was on therapy for >1 year when initiating care with [facility].    Tolerance to current therapy:  Denies nausea, vomiting, diarrhea, constipation, abdominal pain    Weight loss history:  Starting weight: 223 lbs - pt reported  Current weight:    1/29/2025   Recent Readings    Weight (lbs) 154 lb    BMI 24.12 BMI    % weight loss since GLP-1 initiation: 30.9 %    Objective  Lab Results   Component Value Date     12/23/2024     03/16/2024     10/10/2023     Lab Results   Component Value Date    K 4.1 12/23/2024    K 4.3 03/16/2024    K 4.4 10/10/2023     Lab Results   Component Value Date     12/23/2024     03/16/2024     10/10/2023     Lab Results   Component Value Date    CO2 21 (L) 12/23/2024    CO2 25 03/16/2024    CO2 24 10/10/2023     Lab Results   Component Value Date    BUN 14 12/23/2024    BUN 18 03/16/2024    BUN 17 10/10/2023     Lab Results   Component Value Date    GLU 94 12/23/2024    GLU 97 03/16/2024    GLU 86 10/10/2023     Lab Results   Component Value Date    CALCIUM 9.0 12/23/2024    CALCIUM 9.7 03/16/2024    CALCIUM 9.8 10/10/2023     Lab Results   Component Value Date    PROT 6.6 12/23/2024    PROT 7.1 03/16/2024    PROT 7.7 10/10/2023     Lab Results   Component Value Date    ALBUMIN 3.5 12/23/2024    ALBUMIN 3.8 03/16/2024    ALBUMIN 4.1 10/10/2023     Lab Results   Component Value Date    BILITOT 0.5 12/23/2024    BILITOT 0.5 03/16/2024    BILITOT 0.6 10/10/2023     Lab Results   Component Value Date    AST 20 12/23/2024    AST 20 03/16/2024    AST 18 10/10/2023     Lab Results   Component Value Date    ALT 14 12/23/2024    ALT 12 03/16/2024    ALT 10 10/10/2023      Lab Results   Component Value Date    ANIONGAP 8 12/23/2024    ANIONGAP 9 03/16/2024    ANIONGAP 11 10/10/2023     Lab Results   Component Value Date    CREATININE 0.9 12/23/2024    CREATININE 0.8 03/16/2024    CREATININE 0.9 10/10/2023     Lab Results   Component Value Date    EGFRNORACEVR >60.0 12/23/2024    EGFRNORACEVR >60.0 03/16/2024    EGFRNORACEVR >60.0 10/10/2023     Assessment/Plan  - Continue Wegovy 2.4 mg SQ weekly  - Pt [facility] to reduce dose if weight loss continues to decrease with current dose  - RTC in 6 months for follow-up evaluation    Patient consented to pharmacist management via collaborative practice.

## 2025-03-28 ENCOUNTER — TELEPHONE (OUTPATIENT)
Dept: OPHTHALMOLOGY | Facility: CLINIC | Age: 60
End: 2025-03-28
Payer: COMMERCIAL

## 2025-03-28 ENCOUNTER — TELEPHONE (OUTPATIENT)
Dept: INTERNAL MEDICINE | Facility: CLINIC | Age: 60
End: 2025-03-28
Payer: COMMERCIAL

## 2025-03-28 DIAGNOSIS — H02.9 EYELID ABNORMALITY: Primary | ICD-10-CM

## 2025-03-28 NOTE — TELEPHONE ENCOUNTER
"----- Message from Ander sent at 3/28/2025 10:13 AM CDT -----  Scheduling RequestPatient Status: NpScheduling Appt: Eyelid growthTime/Date Preference: TodayContact Preference?: [phone] you feel you need to be seen today? YesAdditional Notes: Pt stating the matter is urgent;  I informed pt that Dr. Egan is our only eyelid specialist and that he is currently out of office until this Monday (3/31)"Thank you for all that you do for our patients"  "

## 2025-04-01 ENCOUNTER — TELEPHONE (OUTPATIENT)
Dept: OPTOMETRY | Facility: CLINIC | Age: 60
End: 2025-04-01
Payer: COMMERCIAL

## 2025-04-01 ENCOUNTER — OFFICE VISIT (OUTPATIENT)
Dept: OPTOMETRY | Facility: CLINIC | Age: 60
End: 2025-04-01
Payer: COMMERCIAL

## 2025-04-01 ENCOUNTER — PATIENT MESSAGE (OUTPATIENT)
Dept: ADMINISTRATIVE | Facility: OTHER | Age: 60
End: 2025-04-01
Payer: COMMERCIAL

## 2025-04-01 DIAGNOSIS — H02.882 MEIBOMIAN GLAND DYSFUNCTION (MGD) OF RIGHT LOWER EYELID: Primary | ICD-10-CM

## 2025-04-01 DIAGNOSIS — H00.12 CHALAZION OF RIGHT LOWER EYELID: ICD-10-CM

## 2025-04-01 DIAGNOSIS — H52.13 MYOPIA WITH ASTIGMATISM AND PRESBYOPIA, BILATERAL: ICD-10-CM

## 2025-04-01 DIAGNOSIS — H52.203 MYOPIA WITH ASTIGMATISM AND PRESBYOPIA, BILATERAL: ICD-10-CM

## 2025-04-01 DIAGNOSIS — H52.4 MYOPIA WITH ASTIGMATISM AND PRESBYOPIA, BILATERAL: ICD-10-CM

## 2025-04-01 PROCEDURE — 99214 OFFICE O/P EST MOD 30 MIN: CPT | Mod: S$GLB,,, | Performed by: OPTOMETRIST

## 2025-04-01 PROCEDURE — 92015 DETERMINE REFRACTIVE STATE: CPT | Mod: S$GLB,,, | Performed by: OPTOMETRIST

## 2025-04-01 PROCEDURE — 1159F MED LIST DOCD IN RCRD: CPT | Mod: CPTII,S$GLB,, | Performed by: OPTOMETRIST

## 2025-04-01 PROCEDURE — 99999 PR PBB SHADOW E&M-EST. PATIENT-LVL II: CPT | Mod: PBBFAC,,, | Performed by: OPTOMETRIST

## 2025-04-01 PROCEDURE — 1160F RVW MEDS BY RX/DR IN RCRD: CPT | Mod: CPTII,S$GLB,, | Performed by: OPTOMETRIST

## 2025-04-01 RX ORDER — NEOMYCIN SULFATE, POLYMYXIN B SULFATE AND DEXAMETHASONE 3.5; 10000; 1 MG/ML; [USP'U]/ML; MG/ML
1 SUSPENSION/ DROPS OPHTHALMIC 4 TIMES DAILY
Qty: 5 ML | Refills: 0 | Status: SHIPPED | OUTPATIENT
Start: 2025-04-01 | End: 2025-04-11

## 2025-04-01 NOTE — PROGRESS NOTES
DAVID    NELL: 09/22  Chief complaint (CC): Patient is here for a problem today but would also   like her annual eye exam today. Patient has had a bump on her RLL for the   past 5 days.  Patient has no other eye symptoms.  Patient has used warm   compresses once at night for 20-30 minutes and on the weekend.  Patient   has distance only glasses and takes off to read.  Distance vision seems   fine with glasses. Patient has to hold thing farther away for near without   glasses.  Glasses? +  Contacts? -  H/o eye surgery, injections or laser: -  H/o eye injury: -  Known eye conditions? See above  Family h/o eye conditions? -  Eye gtts? Warm compresses once at night      (-) Flashes (-)  Floaters (-) Mucous   (-)  Tearing (-) Itching (-) Burning   (-) Headaches (-) Eye Pain/discomfort (-) Irritation   (-)  Redness (-) Double vision (-) Blurry vision    Diabetic? -  A1c? -      Last edited by Soni Machado on 4/1/2025  9:34 AM.            Assessment /Plan     For exam results, see Encounter Report.      Meibomian gland dysfunction (MGD) of right lower eyelid  -     neomycin-polymyxin-dexamethasone (MAXITROL) 3.5mg/mL-10,000 unit/mL-0.1 % DrpS; Place 1 drop into the right eye 4 (four) times daily. for 10 days  Dispense: 5 mL; Refill: 0  Chalazion of right lower eyelid  -     Ambulatory referral/consult to Ophthalmology  -     neomycin-polymyxin-dexamethasone (MAXITROL) 3.5mg/mL-10,000 unit/mL-0.1 % DrpS; Place 1 drop into the right eye 4 (four) times daily. for 10 days  Dispense: 5 mL; Refill: 0  Recommend warm compresses 4 or more times a day to affected eye. Recommend 2x/day for maintenance when lesion has resolved.   Rx Maxitrol QID OD x 10 days.   RTC if S/sx worsen.   Will call w/update in 1 week.     Myopia with astigmatism and presbyopia, bilateral  SRx released to patient. Patient educated on lens options. Normal ocular health. RTC 1 year for routine exam.

## 2025-04-01 NOTE — TELEPHONE ENCOUNTER
----- Message from Oriana sent at 4/1/2025  8:14 AM CDT -----  Regarding: Appt  Contact: Pt  [phone]  Name of Caller:   Celi    Contact Preference:   [phone] of Call:  Would like to know if there is a later appt time if not she will keep original appt please call either way

## 2025-04-09 DIAGNOSIS — H00.19 CHALAZION, UNSPECIFIED LATERALITY: Primary | ICD-10-CM

## 2025-04-09 DIAGNOSIS — H02.882 MEIBOMIAN GLAND DYSFUNCTION (MGD) OF RIGHT LOWER EYELID: ICD-10-CM

## 2025-04-09 DIAGNOSIS — H00.12 CHALAZION OF RIGHT LOWER EYELID: ICD-10-CM

## 2025-04-09 RX ORDER — NEOMYCIN SULFATE, POLYMYXIN B SULFATE AND DEXAMETHASONE 3.5; 10000; 1 MG/ML; [USP'U]/ML; MG/ML
1 SUSPENSION/ DROPS OPHTHALMIC 4 TIMES DAILY
Qty: 5 ML | Refills: 0 | Status: SHIPPED | OUTPATIENT
Start: 2025-04-09 | End: 2025-05-04

## 2025-04-11 ENCOUNTER — TELEPHONE (OUTPATIENT)
Dept: OPTOMETRY | Facility: CLINIC | Age: 60
End: 2025-04-11
Payer: COMMERCIAL

## 2025-04-16 ENCOUNTER — HOSPITAL ENCOUNTER (EMERGENCY)
Facility: HOSPITAL | Age: 60
Discharge: HOME | End: 2025-04-16
Attending: EMERGENCY MEDICINE
Payer: COMMERCIAL

## 2025-04-16 VITALS
BODY MASS INDEX: 24.17 KG/M2 | SYSTOLIC BLOOD PRESSURE: 129 MMHG | WEIGHT: 154 LBS | DIASTOLIC BLOOD PRESSURE: 74 MMHG | HEIGHT: 67 IN | TEMPERATURE: 98 F | RESPIRATION RATE: 20 BRPM | OXYGEN SATURATION: 100 % | HEART RATE: 82 BPM

## 2025-04-16 DIAGNOSIS — R00.0 TACHYCARDIA: Primary | ICD-10-CM

## 2025-04-16 DIAGNOSIS — R00.2 PALPITATIONS: ICD-10-CM

## 2025-04-16 DIAGNOSIS — R07.9 CHEST PAIN: ICD-10-CM

## 2025-04-16 DIAGNOSIS — E83.39 HYPOPHOSPHATEMIA: ICD-10-CM

## 2025-04-16 LAB
ABSOLUTE EOSINOPHIL (OHS): 0.01 K/UL
ABSOLUTE MONOCYTE (OHS): 0.43 K/UL (ref 0.3–1)
ABSOLUTE NEUTROPHIL COUNT (OHS): 5.29 K/UL (ref 1.8–7.7)
ALBUMIN SERPL BCP-MCNC: 3.7 G/DL (ref 3.5–5.2)
ALP SERPL-CCNC: 66 UNIT/L (ref 40–150)
ALT SERPL W/O P-5'-P-CCNC: 11 UNIT/L (ref 10–44)
ANION GAP (OHS): 11 MMOL/L (ref 8–16)
AST SERPL-CCNC: 19 UNIT/L (ref 11–45)
BASOPHILS # BLD AUTO: 0.02 K/UL
BASOPHILS NFR BLD AUTO: 0.2 %
BILIRUB SERPL-MCNC: 0.5 MG/DL (ref 0.1–1)
BNP SERPL-MCNC: 21 PG/ML (ref 0–99)
BUN SERPL-MCNC: 21 MG/DL (ref 6–20)
CALCIUM SERPL-MCNC: 8.9 MG/DL (ref 8.7–10.5)
CHLORIDE SERPL-SCNC: 103 MMOL/L (ref 95–110)
CO2 SERPL-SCNC: 20 MMOL/L (ref 23–29)
CREAT SERPL-MCNC: 0.8 MG/DL (ref 0.5–1.4)
ERYTHROCYTE [DISTWIDTH] IN BLOOD BY AUTOMATED COUNT: 12.3 % (ref 11.5–14.5)
GFR SERPLBLD CREATININE-BSD FMLA CKD-EPI: >60 ML/MIN/1.73/M2
GLUCOSE SERPL-MCNC: 164 MG/DL (ref 70–110)
HCT VFR BLD AUTO: 34.7 % (ref 37–48.5)
HGB BLD-MCNC: 11.7 GM/DL (ref 12–16)
IMM GRANULOCYTES # BLD AUTO: 0.03 K/UL (ref 0–0.04)
IMM GRANULOCYTES NFR BLD AUTO: 0.4 % (ref 0–0.5)
LYMPHOCYTES # BLD AUTO: 2.32 K/UL (ref 1–4.8)
MAGNESIUM SERPL-MCNC: 1.6 MG/DL (ref 1.6–2.6)
MCH RBC QN AUTO: 30.2 PG (ref 27–31)
MCHC RBC AUTO-ENTMCNC: 33.7 G/DL (ref 32–36)
MCV RBC AUTO: 89 FL (ref 82–98)
NUCLEATED RBC (/100WBC) (OHS): 0 /100 WBC
OHS QRS DURATION: 90 MS
OHS QRS DURATION: 96 MS
OHS QTC CALCULATION: 422 MS
OHS QTC CALCULATION: 456 MS
PHOSPHATE SERPL-MCNC: 2.2 MG/DL (ref 2.7–4.5)
PLATELET # BLD AUTO: 250 K/UL (ref 150–450)
PMV BLD AUTO: 11.2 FL (ref 9.2–12.9)
POTASSIUM SERPL-SCNC: 3.5 MMOL/L (ref 3.5–5.1)
PROT SERPL-MCNC: 6.9 GM/DL (ref 6–8.4)
RBC # BLD AUTO: 3.88 M/UL (ref 4–5.4)
RELATIVE EOSINOPHIL (OHS): 0.1 %
RELATIVE LYMPHOCYTE (OHS): 28.6 % (ref 18–48)
RELATIVE MONOCYTE (OHS): 5.3 % (ref 4–15)
RELATIVE NEUTROPHIL (OHS): 65.4 % (ref 38–73)
SODIUM SERPL-SCNC: 134 MMOL/L (ref 136–145)
TROPONIN I SERPL HS-MCNC: 3 NG/L
TROPONIN I SERPL HS-MCNC: 4 NG/L
WBC # BLD AUTO: 8.1 K/UL (ref 3.9–12.7)

## 2025-04-16 PROCEDURE — 93010 ELECTROCARDIOGRAM REPORT: CPT | Mod: ,,, | Performed by: INTERNAL MEDICINE

## 2025-04-16 PROCEDURE — 83735 ASSAY OF MAGNESIUM: CPT

## 2025-04-16 PROCEDURE — 25000003 PHARM REV CODE 250: Performed by: EMERGENCY MEDICINE

## 2025-04-16 PROCEDURE — 93005 ELECTROCARDIOGRAM TRACING: CPT

## 2025-04-16 PROCEDURE — 96360 HYDRATION IV INFUSION INIT: CPT

## 2025-04-16 PROCEDURE — 84132 ASSAY OF SERUM POTASSIUM: CPT | Performed by: EMERGENCY MEDICINE

## 2025-04-16 PROCEDURE — 25000003 PHARM REV CODE 250

## 2025-04-16 PROCEDURE — 99285 EMERGENCY DEPT VISIT HI MDM: CPT | Mod: 25

## 2025-04-16 PROCEDURE — 83880 ASSAY OF NATRIURETIC PEPTIDE: CPT | Performed by: EMERGENCY MEDICINE

## 2025-04-16 PROCEDURE — 93010 ELECTROCARDIOGRAM REPORT: CPT | Mod: 76,,, | Performed by: INTERNAL MEDICINE

## 2025-04-16 PROCEDURE — 85025 COMPLETE CBC W/AUTO DIFF WBC: CPT | Performed by: EMERGENCY MEDICINE

## 2025-04-16 PROCEDURE — 84100 ASSAY OF PHOSPHORUS: CPT

## 2025-04-16 PROCEDURE — 84484 ASSAY OF TROPONIN QUANT: CPT | Performed by: EMERGENCY MEDICINE

## 2025-04-16 RX ORDER — ASPIRIN 325 MG
325 TABLET ORAL
Status: COMPLETED | OUTPATIENT
Start: 2025-04-16 | End: 2025-04-16

## 2025-04-16 RX ORDER — SODIUM,POTASSIUM PHOSPHATES 280-250MG
2 POWDER IN PACKET (EA) ORAL ONCE
Status: COMPLETED | OUTPATIENT
Start: 2025-04-16 | End: 2025-04-16

## 2025-04-16 RX ORDER — LANOLIN ALCOHOL/MO/W.PET/CERES
800 CREAM (GRAM) TOPICAL ONCE
Status: COMPLETED | OUTPATIENT
Start: 2025-04-16 | End: 2025-04-16

## 2025-04-16 RX ADMIN — Medication 800 MG: at 02:04

## 2025-04-16 RX ADMIN — POTASSIUM & SODIUM PHOSPHATES POWDER PACK 280-160-250 MG 2 PACKET: 280-160-250 PACK at 02:04

## 2025-04-16 RX ADMIN — SODIUM CHLORIDE 1000 ML: 9 INJECTION, SOLUTION INTRAVENOUS at 11:04

## 2025-04-16 RX ADMIN — ASPIRIN 325 MG ORAL TABLET 325 MG: 325 PILL ORAL at 11:04

## 2025-04-16 NOTE — ED PROVIDER NOTES
Encounter Date: 2025       History     Chief Complaint   Patient presents with    Tachycardia     Tachycardia since last night. Endorses mild chest pain that was brief.      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  She states she has palpitations on and off for many years dating back to 2016 that she suspects is atrial fibrillation due to irregularity that has always resolved with Valsalva maneuvers.  She has never been on rate or rhythm controlling agents.  She is not anticoagulated.  On  she noticed an episode of palpitations while at an outdoor festival which she suspected may have been due to dehydration.  This episode lasted longer than expected but did ultimately resolve.  This morning around 3:00 a.m. she again noted additional episodes of tachycardia and palpitations that did not resolve with Valsalva.  They continued this morning and prompted her to come to the emergency department.  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  She does note some mild chest pain with these episodes but no shortness of breath, neck or jaw pain, arm pain, back pain, syncope, abdominal pain, fevers/chills.  She states she was recently on a course of cefazolin for chalazion.  She does not drink alcohol.      The history is provided by the patient and medical records.     Review of patient's allergies indicates:  No Known Allergies  Past Medical History:   Diagnosis Date    History of cold sores     Hyperlipidemia     IFG (impaired fasting glucose) 2019    Increased body mass index (BMI)      Past Surgical History:   Procedure Laterality Date    APPENDECTOMY  1979     SECTION       Family History   Problem Relation Name Age of Onset    Ulcers Mother          Duodenal ulcer    Diabetes type II Father      Stroke Father      Colon  cancer Neg Hx       Social History[1]  Review of Systems   Constitutional:  Negative for chills and fever.   HENT:  Negative for sore throat.    Respiratory:  Negative for chest tightness and shortness of breath.    Cardiovascular:  Positive for chest pain and palpitations. Negative for leg swelling.   Gastrointestinal:  Negative for abdominal pain, diarrhea, nausea and vomiting.   Genitourinary:  Negative for dysuria.   Musculoskeletal:  Negative for back pain.   Skin:  Negative for color change and rash.   Neurological:  Negative for dizziness, syncope, weakness and light-headedness.   Hematological:  Does not bruise/bleed easily.   Psychiatric/Behavioral:  The patient is nervous/anxious.    All other systems reviewed and are negative.      Physical Exam     Initial Vitals [04/16/25 1108]   BP Pulse Resp Temp SpO2   132/85 (!) 120 20 97.6 °F (36.4 °C) 99 %      MAP       --         Vitals:    04/16/25 1300 04/16/25 1341 04/16/25 1400 04/16/25 1430   BP: (!) 99/59  122/75 129/74   BP Location:    Right arm   Patient Position:    Lying   Pulse: 80 74 77 82   Resp:  12  20   Temp:    97.8 °F (36.6 °C)   TempSrc:    Oral   SpO2: 100%  100%    Weight:       Height:          Physical Exam    Nursing note and vitals reviewed.  Constitutional: She appears well-developed and well-nourished. She is not diaphoretic. No distress.   Eyes: Conjunctivae and EOM are normal.   Cardiovascular:  Regular rhythm and normal heart sounds.   Tachycardia present.   Exam reveals no gallop.       Rate around 100 on my exam   Pulmonary/Chest: Breath sounds normal. No respiratory distress. She has no wheezes. She has no rales.   Abdominal: Abdomen is soft. Bowel sounds are normal. There is no abdominal tenderness.   Musculoskeletal:         General: No edema. Normal range of motion.     Neurological: She is alert and oriented to person, place, and time. She has normal strength.   Skin: Skin is warm and dry.   Psychiatric: She has a normal  mood and affect. Thought content normal.         ED Course   Procedures  Labs Reviewed   COMPREHENSIVE METABOLIC PANEL - Abnormal       Result Value    Sodium 134 (*)     Potassium 3.5      Chloride 103      CO2 20 (*)     Glucose 164 (*)     BUN 21 (*)     Creatinine 0.8      Calcium 8.9      Protein Total 6.9      Albumin 3.7      Bilirubin Total 0.5      ALP 66      AST 19      ALT 11      Anion Gap 11      eGFR >60     CBC WITH DIFFERENTIAL - Abnormal    WBC 8.10      RBC 3.88 (*)     HGB 11.7 (*)     HCT 34.7 (*)     MCV 89      MCH 30.2      MCHC 33.7      RDW 12.3      Platelet Count 250      MPV 11.2      Nucleated RBC 0      Neut % 65.4      Lymph % 28.6      Mono % 5.3      Eos % 0.1      Basophil % 0.2      Imm Grans % 0.4      Neut # 5.29      Lymph # 2.32      Mono # 0.43      Eos # 0.01      Baso # 0.02      Imm Grans # 0.03     PHOSPHORUS - Abnormal    Phosphorus Level 2.2 (*)    TROPONIN I HIGH SENSITIVITY - Normal    Troponin High Sensitive 3     TROPONIN I HIGH SENSITIVITY - Normal    Troponin High Sensitive 4     B-TYPE NATRIURETIC PEPTIDE - Normal    BNP 21     MAGNESIUM - Normal    Magnesium  1.6     CBC W/ AUTO DIFFERENTIAL    Narrative:     The following orders were created for panel order CBC auto differential.  Procedure                               Abnormality         Status                     ---------                               -----------         ------                     CBC with Differential[[id number]]       Abnormal            Final result                 Please view results for these tests on the individual orders.        ECG Results              EKG 12-lead (Final result)        Collection Time Result Time QRS Duration [facility] QTC Calculation    04/16/25 11:33:29 04/16/25 12:39:17 96 456                     Final result by Interface, Lab In [facility] (04/16/25 12:39:21)                   Narrative:    Test Reason : R07.9,    Vent. Rate :  99 BPM     Atrial Rate :  99 BPM     P-R  Int : 128 ms          QRS Dur :  96 ms      QT Int : 356 ms       P-R-T Axes :  37  51  47 degrees    QTcB Int : 456 ms    Normal sinus rhythm  Normal ECG  When compared with ECG of 16-Apr-2025 11:14,  Nonspecific T wave abnormality no longer evident in Inferior leads  Confirmed by Dinesh Garcia ([id number]) on 4/16/2025 12:39:11 PM    Referred By: AAAREFERRAL SELF           Confirmed By: Dinesh Garcia                                     EKG 12-lead (Final result)        Collection Time Result Time QRS Duration [facility] QTC Calculation    04/16/25 11:14:01 04/16/25 12:32:09 90 422                     Final result by Interface, Lab In [facility] (04/16/25 12:32:14)                   Narrative:    Test Reason : R00.0,    Vent. Rate : 105 BPM     Atrial Rate : 105 BPM     P-R Int : 130 ms          QRS Dur :  90 ms      QT Int : 320 ms       P-R-T Axes :  72  39  20 degrees    QTcB Int : 422 ms    Sinus tachycardia  Nonspecific ST abnormality  Abnormal ECG  When compared with ECG of 12-Nov-2020 13:25,  No significant change was found  Confirmed by Dinesh Garcia ([id number]) on 4/16/2025 12:32:08 PM    Referred By:            Confirmed By: Dinesh Garcia                                  Imaging Results              X-Ray Chest AP Portable (Final result)  Result time 04/16/25 12:03:23      Final result by Duncan Pantoja MD (04/16/25 12:03:23)                   Impression:      See above      Electronically signed by: Duncan Pantoja MD  Date:    04/16/2025  Time:    12:03               Narrative:    EXAMINATION:  XR CHEST AP PORTABLE    CLINICAL HISTORY:  Chest Pain;    TECHNIQUE:  Single frontal view of the chest was performed.    COMPARISON:  None    FINDINGS:  Heart size normal.  The lungs are clear.  No pleural effusion.                                       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  Note she has had episodes of palpitations in the past that resolved with Valsalva dating back to 2016.  Differential diagnosis includes but not limited to supraventricular tachycardia, atrial fibrillation, structural heart disease, valvular abnormalities, ACS, heart failure, pulmonary embolism, intravascular volume depletion.  Do not suspect sepsis.      Last echo from 2021 normal, no valvular abnormalities noted. Trace mitral and tricuspid regurg. No atrial dilatation. Normal ejection fraction.  Wells score for pulmonary embolism calculated 1.5, low risk.  Initial workup to include CBC, CMP, troponin, BNP, EKG, chest x-ray.  Loaded with 325 mg aspirin.     Initial 12 lead EKGs showing sinus tachycardia.  P Waves present.  No ST changes.  Orthostatic vital signs positive for tachycardia with standing, no significant drop in blood pressure.  1 L normal saline infusing.     Troponin and BNP within normal limits.  Hemoglobin 11.7, similar to prior.  No leukocytosis or thrombocytopenia.  CMP significant for BUN of 21, creatinine 0.8.  BUN / creatinine ratio above 20, suggestive of possible dehydration.  Pulse appears to have improved with IV fluids.  Phosphorus 2.2.  Magnesium 1.6, low normal.  Oral phosphorus and magnesium replacements ordered.  Repeat troponin negative.  Suspect tachycardia/arrhythmia secondary to mild electrolyte disturbances and/or mild dehydration.  Now resolving.  No additional symptoms or events while in emergency department.    Spoke with ED cardiology fellow via telephone who will review and speak with the patient.  Discussed with cardiology fellow in person who plans to coordinate cardiac event monitor and follow up in Cardiology  Clinic.  Telemetry reviewed, no additional events.  Patient agreeable for discharge with close cardiology follow-up.  Advised to follow up with primary care doctor in 7-10 days for electrolyte recheck.  Return precautions discussed.  Discharged in stable condition.    Problems Addressed:  Chest pain: acute illness or injury that poses a threat to life or bodily functions  Palpitations: acute illness or injury that poses a threat to life or bodily functions  Tachycardia: acute illness or injury that poses a threat to life or bodily functions    Amount and/or Complexity of Data Reviewed  External Data Reviewed: notes.     Details: Echocardiogram from 07/14/2021:  · The estimated ejection fraction is 60%.  · The left ventricle is normal in size with normal systolic function.  · Normal left ventricular diastolic function.  · Normal right ventricular size with normal right ventricular systolic function.  · The estimated PA systolic pressure is 29 mmHg.  · Normal central venous pressure (3 mmHg).    Labs: ordered. Decision-making details documented in ED Course.  Radiology: ordered and independent interpretation performed. Decision-making details documented in ED Course.     Details: No obvious infiltrates or pulmonary edema noted  ECG/medicine tests: ordered and independent interpretation performed. Decision-making details documented in ED Course.     Details: No STEMI  Discussion of management or test interpretation with external provider(s): We discussed the case with the cardiology fellow-she was in the emergency department at bedside with the patient working through on appropriate treatment plan for outpatient workup.    Risk  OTC drugs.  Decision regarding hospitalization.  Risk Details: Differential diagnosis includes arrhythmia-AFib, a flutter, V-tach, VFib, SVT, ACS, PE, CHF, etc.              Attending Attestation:   Physician Attestation Statement for Resident:  As the supervising MD   Physician Attestation  Statement: I have personally seen and examined this patient.   I agree with the above history.  -: Patient has avoided caffeine for the last 3 days   As the supervising MD I agree with the above PE.   -: Patient's pulse was around 100 on my examination   As the supervising MD I agree with the above treatment, course, plan, and disposition.    I have reviewed and agree with the residents interpretation of the following: lab data, x-rays, EKG and rhythm strips.                 ED Course as of 04/16/25 2027 Wed Apr 16, 2025   1143 BP: 132/85 [AK]   1143 Temp: 97.6 °F (36.4 °C) [AK]   1143 Pulse(!): 120 [AK]   1143 SpO2: 99 % [AK]   1152 Orthostatic vital signs completed, documented in RN documentation.  Normal saline bolus infusing [AK]   1220 Troponin I High Sensitivity: 3 [AK]   1220 BNP: 21 [AK]   1220 WBC: 8.10 [AK]   1220 Hemoglobin(!): 11.7 [AK]   1220 Platelet Count: 250 [AK]   1220 X-Ray Chest AP Portable  Normal CXR [AK]   1220 Comprehensive metabolic panel(!)  Na 134, CO2 20, glucose 164, BUN 21. Otherwise normal renal function, liver enzymes, electrolytes.  [AK]   1222 BP: 111/65 [AK]   1222 Pulse: 84 [AK]   1222 SpO2: 100 % [AK]   1232 EKG 12-lead  Sinus tachycardia [AK]   1239 Troponin I High Sensitivity: 3 [LYNSEY]   1239 Sodium(!): 134 [LYNSEY]   1239 CO2(!): 20 [LYNSEY]   1239 Glucose(!): 164 [LYNSEY]   1239 BUN(!): 21 [LYNSEY]   1239 WBC: 8.10 [LYNSEY]   1239 Hemoglobin(!): 11.7 [LYNSEY]   1239 Hematocrit(!): 34.7 [LYNSEY]   1306 BP(!): 99/59 [AK]   1306 Pulse: 80 [AK]   1314 Phosphorus Level(!): 2.2 [LYNSEY]   1315 BUN(!): 21 [LYNSEY]   1315 Glucose(!): 164 [LYNSEY]   1315 WBC: 8.10 [LYNSEY]   1315 Hemoglobin(!): 11.7 [LYNSEY]   1315 Hematocrit(!): 34.7 [LYNSEY]   1315 X-Ray Chest AP Portable [LYNSEY]   1316 Phosphorus Level(!): 2.2 [AK]   1320 I spoke with the patient and she has no complaints.  She has normal sinus rhythm around 80.  Systolic blood pressure around 100.  Awaiting eval from cardiology  [LYNSEY]   1342 Pulse: 74 [AK]   1407 BP: 122/75 [AK]   1407  Pulse: 77 [AK]   1407 SpO2: 100 % [AK]   1424 Troponin I High Sensitivity: 4 [LYNSEY]      ED Course User Index  [AK] Terry Hearn MD  [LYNSEY] Eddi Santana MD                           Clinical Impression:  Final diagnoses:  [R00.0] Tachycardia (Primary)  [R07.9] Chest pain  [R00.2] Palpitations  [E83.39] Hypophosphatemia          ED Disposition Condition    Discharge Stable          ED Prescriptions    None       Follow-up Information       Follow up With Specialties Details Why Contact Info Additional Information    Ander Moreno MD Internal Medicine Schedule an appointment as soon as possible for a visit  As needed [facility] [address]  [phone]       [facility] - Emergency Dept Emergency Medicine Go to  As needed, If symptoms worsen [facility] [facility] [phone]     [facility] - Cardiology - [facility] Cardiology Schedule an appointment as soon as possible for a visit in 6 week(s)  [address] [place] [address] [phone] Cardiology Services Clinics - 3rd floor             Terry Hearn MD  Resident  04/16/25 1537         [1]   Social History  Tobacco Use    Smoking status: Never    Smokeless tobacco: Never   Substance Use Topics    Alcohol use: Yes     Comment: socially    Drug use: Never        Eddi Santana MD  04/16/25 2030

## 2025-04-16 NOTE — ED TRIAGE NOTES
Celi Bermeo, a 60 y.o. female presents to the ED w/ complaint of tachycardia. Pt arrives to the ED via private vehicle with complaints of tachycardia. Pt states the onset of symptoms began Sunday while she was out at a festival. Pt states she believes that she may have Atrial Fibrillation due having experienced this is past. Pt describes the feeling as slight mid sternal chest pain that radiated to the left anterior chest way. 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 Pt states that she experienced the chest pain for a brief moment this am around 3am but it's since then subsided. Pt denies any shortness of breath, weakness, dizziness or cold/flu like symptoms.     Triage note:  Chief Complaint   Patient presents with    Tachycardia     Tachycardia since last night. Endorses mild chest pain that was brief.      Review of patient's allergies indicates:  No Known Allergies  Past Medical History:   Diagnosis Date    History of cold sores     Hyperlipidemia 2021    IFG (impaired fasting glucose) 2019    Increased body mass index (BMI)    Tachycardia. Pt arrives to the ED via

## 2025-04-16 NOTE — DISCHARGE INSTRUCTIONS
It was a pleasure taking care of you today.  Your phosphorus was 2.2, magnesium 1.6, potassium 3.5.  BUN 21.  Creatinine 0.8.  Recommend you follow-up with your primary care doctor in 1-2 weeks as well as follow up with Cardiology.  Returned to the emergency department if you have chest pain, shortness of breath, ongoing palpitations or tachycardia, dizziness, lightheadedness, syncope.

## 2025-04-16 NOTE — Clinical Note
"Celi"Rebeca Bermeo was seen and treated in our emergency department on 4/16/2025.  She may return to work on 04/17/2025.       If you have any questions or concerns, please don't hesitate to call.      Terry Hearn MD"

## 2025-04-16 NOTE — Clinical Note
"Celi"Rebeca Bermeo was seen and treated in our emergency department on 4/16/2025.  She may return to work on 04/16/2025.       If you have any questions or concerns, please don't hesitate to call.      Terry Hearn MD"

## 2025-04-17 DIAGNOSIS — R00.0 TACHYCARDIA, UNSPECIFIED: Primary | ICD-10-CM

## 2025-04-23 ENCOUNTER — CLINICAL SUPPORT (OUTPATIENT)
Dept: CARDIOLOGY | Facility: HOSPITAL | Age: 60
End: 2025-04-23
Attending: STUDENT IN AN ORGANIZED HEALTH CARE EDUCATION/TRAINING PROGRAM
Payer: COMMERCIAL

## 2025-04-23 DIAGNOSIS — R00.0 TACHYCARDIA, UNSPECIFIED: ICD-10-CM

## 2025-04-23 PROCEDURE — 93271 ECG/MONITORING AND ANALYSIS: CPT

## 2025-05-05 ENCOUNTER — RESULTS FOLLOW-UP (OUTPATIENT)
Dept: INTERNAL MEDICINE | Facility: CLINIC | Age: 60
End: 2025-05-05

## 2025-05-12 ENCOUNTER — TELEPHONE (OUTPATIENT)
Dept: INTERNAL MEDICINE | Facility: CLINIC | Age: 60
End: 2025-05-12
Payer: COMMERCIAL

## 2025-05-12 DIAGNOSIS — E87.1 HYPONATREMIA: Primary | ICD-10-CM

## 2025-05-12 RX ORDER — SODIUM,POTASSIUM PHOSPHATES 280-250MG
POWDER IN PACKET (EA) ORAL
Qty: 100 PACKET | Refills: 1 | Status: SHIPPED | OUTPATIENT
Start: 2025-05-12

## 2025-05-16 ENCOUNTER — TELEPHONE (OUTPATIENT)
Dept: CARDIOLOGY | Facility: HOSPITAL | Age: 60
End: 2025-05-16
Payer: COMMERCIAL

## 2025-05-16 ENCOUNTER — PATIENT MESSAGE (OUTPATIENT)
Dept: CARDIOLOGY | Facility: HOSPITAL | Age: 60
End: 2025-05-16
Payer: COMMERCIAL

## 2025-05-16 DIAGNOSIS — I47.10 SVT (SUPRAVENTRICULAR TACHYCARDIA): Primary | ICD-10-CM

## 2025-05-16 NOTE — TELEPHONE ENCOUNTER
"Patient wearing 30 day event monitor for diagnosis Tachycardia.     Received 6 auto-triggered alert notifications and 4 patient-triggered alert notifications (10 total) for SVT on:  5/15/25 at 7:34 PM (patient activated)  5/15/25 at 8:02 PM (patient activated)  5/15/25 at 10:22 PM (auto-triggered)  5/16/25 at 4:19 AM (auto-triggered)  5/16/25 at 5:27 AM (patient activated)  5/16/25 at 8:22 AM (patient activated)  5/16/25 at 8:15 AM (auto-triggered)  5/16/25 at 9:17 AM (auto-triggered)  5/16/25 at 9:31 AM (auto-triggered)  5/16/25 at 10:36 AM (auto-triggered)        Called patient to assess for symptoms.   Made contact with the patient at 11:38AM on 5/16/25. Pt states she is out of town at a work conference in [facility]. States she noticed beginning last night that she was having palpitations, felt her HR pounding. States she hit the patient symptom button. Notes that she re-pressed the button on some instances when she felt her HR sped up. Reports she has been in meetings all morning. States she is now back in her hotel room and performed the Valsalva maneuver and is drinking water. Reports feeling "the best I have felt since last night". Reports she does not feel her HR racing at this time and pulse rate appears normal. Advised that should symptoms reoccur or become prolonged again, she should seek medical evaluation in the nearest ER. She confirmed understanding.      EGMs reveal SVT. Unknown total duration as onset or termination is not seen.    Strips placed under this encounter for review. Full PDFs can be found linked to this encounter or in Media tab.   Message sent to ordering provider. Will continue to monitor until 5/23/25.                                                              "

## 2025-05-16 NOTE — TELEPHONE ENCOUNTER
Reviewed auto triggered alerts from 30 day event monitor which was ordered after patient had presented to the ER with c/o palpitations. 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 Please refer to most recent conversation with CARIDAD Sandy.     NSVT episodes that improve with valsalva maneuver.       Referring to EP for further evaluation. Adding an official echo and would recommend starting a low dose BB.       Will arrange with our clinic to schedule.           Kathleen Nice MD  Cardiology fellow

## 2025-05-19 ENCOUNTER — TELEPHONE (OUTPATIENT)
Dept: ELECTROPHYSIOLOGY | Facility: CLINIC | Age: 60
End: 2025-05-19
Payer: COMMERCIAL

## 2025-05-19 DIAGNOSIS — R00.0 TACHYCARDIA, UNSPECIFIED: ICD-10-CM

## 2025-05-19 DIAGNOSIS — I48.0 PAROXYSMAL ATRIAL FIBRILLATION: Primary | ICD-10-CM

## 2025-05-19 DIAGNOSIS — I47.10 SVT (SUPRAVENTRICULAR TACHYCARDIA): Primary | ICD-10-CM

## 2025-05-19 NOTE — TELEPHONE ENCOUNTER
Kathleen Forrester MD to Celi VERNON      5/16/25  2:45 PM  Hi Dr. Bermeo,         I looked at some of the strips from your monitor showing SVT and read that you have been doing valsalva maneuvers which improved your symptoms. I agree with our nurse if these get worse to visit the ER. I am not sure if they referred you to see EP but will send out a referral and for an echocardiogram to do when you get back. I can prescribe you a lower dose of BB as toprol 25mg until you get to see EP. Let me know if you would like me to and I will send the prescription.            Take care,      Kathleen Ji  Cardiology fellow

## 2025-05-21 RX ORDER — METOPROLOL SUCCINATE 25 MG/1
25 TABLET, EXTENDED RELEASE ORAL DAILY
Qty: 90 TABLET | Refills: 3 | Status: SHIPPED | OUTPATIENT
Start: 2025-05-21 | End: 2026-05-21

## 2025-05-29 ENCOUNTER — HOSPITAL ENCOUNTER (OUTPATIENT)
Dept: CARDIOLOGY | Facility: HOSPITAL | Age: 60
Discharge: HOME | End: 2025-05-29
Attending: STUDENT IN AN ORGANIZED HEALTH CARE EDUCATION/TRAINING PROGRAM
Payer: COMMERCIAL

## 2025-05-29 VITALS
SYSTOLIC BLOOD PRESSURE: 117 MMHG | DIASTOLIC BLOOD PRESSURE: 77 MMHG | WEIGHT: 154.13 LBS | BODY MASS INDEX: 24.19 KG/M2 | HEIGHT: 67 IN

## 2025-05-29 DIAGNOSIS — I47.10 SVT (SUPRAVENTRICULAR TACHYCARDIA): ICD-10-CM

## 2025-05-29 LAB
AORTIC SIZE INDEX (SOV): 1.9 CM/M2
AORTIC SIZE INDEX: 1.7 CM/M2
ASCENDING AORTA: 3.1 CM
AV AREA BY CONTINUOUS VTI: 3.4 CM2
AV INDEX (PROSTH): 0.95
AV LVOT MEAN GRADIENT: 2 MMHG
AV LVOT PEAK GRADIENT: 3 MMHG
AV MEAN GRADIENT: 2 MMHG
AV PEAK GRADIENT: 3 MMHG
AV VALVE AREA BY VELOCITY RATIO: 3.5 CM²
AV VALVE AREA: 3.3 CM2
AV VELOCITY RATIO: 1
BSA FOR ECHO PROCEDURE: 1.82 M2
CV ECHO LV RWT: 0.12 CM
DOP CALC AO PEAK VEL: 0.9 M/S
DOP CALC AO VTI: 20 CM
DOP CALC LVOT AREA: 3.5 CM2
DOP CALC LVOT DIAMETER: 2.1 CM
DOP CALC LVOT PEAK VEL: 0.9 M/S
DOP CALC LVOT STROKE VOLUME: 65.4 CM3
DOP CALCLVOT PEAK VEL VTI: 18.9 CM
E WAVE DECELERATION TIME: 235 MS
E/A RATIO: 1.23
E/E' RATIO: 6 M/S
ECHO EF ESTIMATED: 54 %
ECHO LV POSTERIOR WALL: 0.3 CM (ref 0.6–1.1)
EJECTION FRACTION: 55 %
FRACTIONAL SHORTENING: 27.5 % (ref 28–44)
INTERVENTRICULAR SEPTUM: 0.5 CM (ref 0.6–1.1)
IVC DIAMETER: 1.93 CM
LA MAJOR: 4.9 CM
LA MINOR: 4.8 CM
LA WIDTH: 4.2 CM
LEFT ATRIUM SIZE: 3.2 CM
LEFT ATRIUM VOLUME INDEX MOD: 30 ML/M2
LEFT ATRIUM VOLUME INDEX: 31 ML/M2
LEFT ATRIUM VOLUME MOD: 55 ML
LEFT ATRIUM VOLUME: 55 CM3
LEFT INTERNAL DIMENSION IN SYSTOLE: 3.7 CM (ref 2.1–4)
LEFT VENTRICLE DIASTOLIC VOLUME INDEX: 69.06 ML/M2
LEFT VENTRICLE DIASTOLIC VOLUME: 125 ML
LEFT VENTRICLE MASS INDEX: 33.8 G/M2
LEFT VENTRICLE SYSTOLIC VOLUME INDEX: 31.5 ML/M2
LEFT VENTRICLE SYSTOLIC VOLUME: 57 ML
LEFT VENTRICULAR INTERNAL DIMENSION IN DIASTOLE: 5.1 CM (ref 3.5–6)
LEFT VENTRICULAR MASS: 61.1 G
LV LATERAL E/E' RATIO: 4.9
LV SEPTAL E/E' RATIO: 6.8
MV A" WAVE DURATION": 88.49 MS
MV PEAK A VEL: 0.44 M/S
MV PEAK E VEL: 0.54 M/S
OHS CV RV/LV RATIO: 0.73 CM
PISA TR MAX VEL: 1.9 M/S
PULM VEIN A" WAVE DURATION": 88.49 MS
PULM VEIN S/D RATIO: 1.17
PULMONIC VEIN PEAK A VELOCITY: 0.2 M/S
PV PEAK D VEL: 0.46 M/S
PV PEAK S VEL: 0.54 M/S
RA MAJOR: 4.28 CM
RA PRESSURE ESTIMATED: 3 MMHG
RA WIDTH: 3.26 CM
RIGHT ATRIAL AREA: 11.3 CM2
RIGHT VENTRICLE DIASTOLIC BASEL DIMENSION: 3.7 CM
RV TB RVSP: 5 MMHG
RV TISSUE DOPPLER FREE WALL SYSTOLIC VELOCITY 1 (APICAL 4 CHAMBER VIEW): 9.07 CM/S
SINUS: 3.35 CM
STJ: 3.1 CM
TDI LATERAL: 0.11 M/S
TDI SEPTAL: 0.08 M/S
TDI: 0.1 M/S
TRICUSPID ANNULAR PLANE SYSTOLIC EXCURSION: 1.5 CM
TV PEAK GRADIENT: 15 MMHG
TV REST PULMONARY ARTERY PRESSURE: 17 MMHG
Z-SCORE OF LEFT VENTRICULAR DIMENSION IN END DIASTOLE: 0.18
Z-SCORE OF LEFT VENTRICULAR DIMENSION IN END SYSTOLE: 1.41

## 2025-05-29 PROCEDURE — 93306 TTE W/DOPPLER COMPLETE: CPT | Mod: 26,,, | Performed by: INTERNAL MEDICINE

## 2025-05-29 PROCEDURE — 93306 TTE W/DOPPLER COMPLETE: CPT

## 2025-06-01 PROBLEM — I47.10 PAROXYSMAL SVT (SUPRAVENTRICULAR TACHYCARDIA): Status: ACTIVE | Noted: 2025-06-01

## 2025-06-02 ENCOUNTER — OFFICE VISIT (OUTPATIENT)
Dept: ELECTROPHYSIOLOGY | Facility: CLINIC | Age: 60
End: 2025-06-02
Payer: COMMERCIAL

## 2025-06-02 ENCOUNTER — RESULTS FOLLOW-UP (OUTPATIENT)
Dept: CARDIOLOGY | Facility: CLINIC | Age: 60
End: 2025-06-02

## 2025-06-02 VITALS
WEIGHT: 162.25 LBS | HEIGHT: 67 IN | DIASTOLIC BLOOD PRESSURE: 72 MMHG | BODY MASS INDEX: 25.47 KG/M2 | HEART RATE: 79 BPM | SYSTOLIC BLOOD PRESSURE: 120 MMHG

## 2025-06-02 DIAGNOSIS — I47.10 PAROXYSMAL SVT (SUPRAVENTRICULAR TACHYCARDIA): Primary | ICD-10-CM

## 2025-06-02 DIAGNOSIS — I47.10 SVT (SUPRAVENTRICULAR TACHYCARDIA): ICD-10-CM

## 2025-06-02 PROCEDURE — 99204 OFFICE O/P NEW MOD 45 MIN: CPT | Mod: S$GLB,,, | Performed by: INTERNAL MEDICINE

## 2025-06-02 PROCEDURE — 3008F BODY MASS INDEX DOCD: CPT | Mod: CPTII,S$GLB,, | Performed by: INTERNAL MEDICINE

## 2025-06-02 PROCEDURE — 1159F MED LIST DOCD IN RCRD: CPT | Mod: CPTII,S$GLB,, | Performed by: INTERNAL MEDICINE

## 2025-06-02 PROCEDURE — 99999 PR PBB SHADOW E&M-EST. PATIENT-LVL III: CPT | Mod: PBBFAC,,, | Performed by: INTERNAL MEDICINE

## 2025-06-02 PROCEDURE — 3074F SYST BP LT 130 MM HG: CPT | Mod: CPTII,S$GLB,, | Performed by: INTERNAL MEDICINE

## 2025-06-02 PROCEDURE — 3078F DIAST BP <80 MM HG: CPT | Mod: CPTII,S$GLB,, | Performed by: INTERNAL MEDICINE

## 2025-06-02 RX ORDER — METOPROLOL TARTRATE 25 MG/1
25 TABLET, FILM COATED ORAL DAILY PRN
Qty: 30 TABLET | Refills: 0 | Status: SHIPPED | OUTPATIENT
Start: 2025-06-02 | End: 2025-07-02

## 2025-06-11 ENCOUNTER — RESULTS FOLLOW-UP (OUTPATIENT)
Dept: CARDIOLOGY | Facility: HOSPITAL | Age: 60
End: 2025-06-11

## 2025-06-22 ENCOUNTER — PATIENT MESSAGE (OUTPATIENT)
Dept: ADMINISTRATIVE | Facility: OTHER | Age: 60
End: 2025-06-22
Payer: COMMERCIAL

## 2025-07-19 ENCOUNTER — PATIENT MESSAGE (OUTPATIENT)
Dept: ADMINISTRATIVE | Facility: OTHER | Age: 60
End: 2025-07-19
Payer: COMMERCIAL

## 2025-07-30 DIAGNOSIS — B00.2 ORAL HERPES SIMPLEX INFECTION: ICD-10-CM

## 2025-07-31 RX ORDER — VALACYCLOVIR HYDROCHLORIDE 1 G/1
1000 TABLET, FILM COATED ORAL EVERY 8 HOURS PRN
Qty: 21 TABLET | Refills: 5 | Status: SHIPPED | OUTPATIENT
Start: 2025-07-31 | End: 2025-08-08

## 2025-07-31 NOTE — TELEPHONE ENCOUNTER
Refill Routing Note   Medication(s) are not appropriate for processing by [facility] for the following reason(s):        No active prescription written by provider    [facility] action(s):  Defer             Appointments  past 12m or future 3m with PCP    Date Provider   Last Visit   12/23/2024 Ander Moreno MD   Next Visit   Visit date not found Ander Moreno MD   ED visits in past 90 days: 0        Note composed:10:03 PM 07/30/2025

## 2025-07-31 NOTE — TELEPHONE ENCOUNTER
No care due was identified.  [facility] Embedded Care Due Messages. Reference number: [id number].   7/30/2025 7:53:37 PM CDT

## 2025-08-13 RX ORDER — SEMAGLUTIDE 2.4 MG/.75ML
2.4 INJECTION, SOLUTION SUBCUTANEOUS
Qty: 3 ML | Refills: 0 | Status: ACTIVE | OUTPATIENT
Start: 2025-08-13

## 2025-08-22 DIAGNOSIS — I47.10 SVT (SUPRAVENTRICULAR TACHYCARDIA): ICD-10-CM

## 2025-08-22 DIAGNOSIS — I47.10 PAROXYSMAL SVT (SUPRAVENTRICULAR TACHYCARDIA): Primary | ICD-10-CM
